# Patient Record
Sex: MALE | Race: WHITE | NOT HISPANIC OR LATINO | Employment: OTHER | URBAN - METROPOLITAN AREA
[De-identification: names, ages, dates, MRNs, and addresses within clinical notes are randomized per-mention and may not be internally consistent; named-entity substitution may affect disease eponyms.]

---

## 2023-09-17 PROBLEM — R13.12 DYSPHAGIA, OROPHARYNGEAL PHASE: Status: ACTIVE | Noted: 2023-09-17

## 2023-09-17 PROBLEM — J98.8 AIRWAY OBSTRUCTION: Status: ACTIVE | Noted: 2023-09-17

## 2023-09-17 PROBLEM — H65.20 CHRONIC SEROUS OTITIS MEDIA: Status: ACTIVE | Noted: 2023-09-17

## 2023-09-17 PROBLEM — L92.9: Status: ACTIVE | Noted: 2023-09-17

## 2023-09-17 PROBLEM — C32.9 LARYNGEAL CANCER (MULTI): Status: ACTIVE | Noted: 2023-09-17

## 2023-09-17 RX ORDER — SODIUM CHLORIDE 0.9 G/100ML
IRRIGANT IRRIGATION
COMMUNITY
Start: 2021-10-06

## 2023-10-17 NOTE — PROGRESS NOTES
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 This obviously will be a lifetime issue.     Status post myringotomy in the left ear.     I will see him in 6 months.      Chief Complaint     Follow-up status post tracheotomy and PEG tube      History of Present IllnessThis gentleman was seen in September 2021 at the request of his family physician. 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 For 4 years or so he has had some issues with episodes of pneumonia from aspiration. His swallowing has been more difficult and also the patient has noticed increased difficulty with breathing. 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 This was performed on September 30, 2021. The patient is here today for follow-up. He seems to be doing well. He was also complaining of some issues with hearing. 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  He also had a myringotomy tube placed on the left side.          Physical Exam  The examination of the neck shows the tracheotomy tube to be in proper position. The patient is breathing properly. His voice remains fairly weak.      The PEG tube site was also examined.  There is no significant granulation.  The tip was changed because it was broken.     A flexible laryngoscopy was carried out. Under topical Xylocaine and Micky-Synephrine the scope was introduced through the nostril. The nasopharynx, base of tongue, hypopharynx, and larynx are visualized. There is significant distortion of the larynx and the anatomy is difficult to delineate.  The larynx is completely  "closed.                        Additional Documentation    Vitals: Temp 36.4 °C (97.6 °F)     Ht 1.727 m (5' 8\")     Wt 61.3 kg (135 lb 2 oz)     BMI 20.55 kg/m²     BSA 1.71 m²   Flowsheets: Interfaced Flowsheet Data   Encounter Info: Billing Info,     History,     Allergies     Orders Placed    None  Medication Changes      None  Medication List  Visit Diagnoses      None  Problem List  "

## 2023-10-18 ENCOUNTER — OFFICE VISIT (OUTPATIENT)
Dept: OTOLARYNGOLOGY | Facility: CLINIC | Age: 88
End: 2023-10-18
Payer: MEDICARE

## 2023-10-18 VITALS — TEMPERATURE: 97.7 F | BODY MASS INDEX: 20.44 KG/M2 | WEIGHT: 134.4 LBS

## 2023-10-18 DIAGNOSIS — R13.12 DYSPHAGIA, OROPHARYNGEAL PHASE: ICD-10-CM

## 2023-10-18 DIAGNOSIS — C32.9 LARYNGEAL CANCER (MULTI): ICD-10-CM

## 2023-10-18 DIAGNOSIS — J98.8 AIRWAY OBSTRUCTION: Primary | ICD-10-CM

## 2023-10-18 PROCEDURE — 31575 DIAGNOSTIC LARYNGOSCOPY: CPT | Performed by: OTOLARYNGOLOGY

## 2023-10-18 PROCEDURE — 1036F TOBACCO NON-USER: CPT | Performed by: OTOLARYNGOLOGY

## 2023-10-18 PROCEDURE — 1160F RVW MEDS BY RX/DR IN RCRD: CPT | Performed by: OTOLARYNGOLOGY

## 2023-10-18 PROCEDURE — 99213 OFFICE O/P EST LOW 20 MIN: CPT | Performed by: OTOLARYNGOLOGY

## 2023-10-18 PROCEDURE — 1159F MED LIST DOCD IN RCRD: CPT | Performed by: OTOLARYNGOLOGY

## 2023-10-18 ASSESSMENT — PATIENT HEALTH QUESTIONNAIRE - PHQ9
SUM OF ALL RESPONSES TO PHQ9 QUESTIONS 1 AND 2: 0
2. FEELING DOWN, DEPRESSED OR HOPELESS: NOT AT ALL
1. LITTLE INTEREST OR PLEASURE IN DOING THINGS: NOT AT ALL

## 2024-04-23 NOTE — PROGRESS NOTES
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 This obviously will be a lifetime issue.  He needs to have his PEG tube changed and he will be set up for an appointment at the [facility].    Status post myringotomy in the left ear.  He had impacted cerumen and the tube was extruded and this was cleaned out with small instruments.     I will see him at the [facility] to change his PEG tube.     Chief Complaint     Follow-up status post tracheotomy and PEG tube placement.  Status posttreatment of laryngeal cancer     History of Present Illness    This gentleman was seen in September 2021 at the request of his family physician. 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 For 4 years or so he has had some issues with episodes of pneumonia from aspiration. His swallowing has been more difficult and also the patient has noticed increased difficulty with breathing. 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 This was performed on September 30, 2021. The patient is here today for follow-up.  He has had some issues with putting his hearing aids in his left ear.  This is the ear with a myringotomy tube.  He also has some issues with his PEG tube.     Physical Exam    The examination of the neck shows the tracheotomy tube to be in proper position.  I cannot appreciate any worrisome masses or adenopathies.  The patient is breathing properly. His voice remains fairly weak.      The PEG tube site was also examined.  He has some granulation tissue but he also has a tube that is really falling apart.  This will need to be  changed.    A flexible laryngoscopy was carried out. Under topical Xylocaine and Micky-Synephrine the scope was introduced through the nostril. The nasopharynx, base of tongue, hypopharynx, and larynx are visualized. There is significant distortion of the larynx and the anatomy is difficult to delineate.  The larynx is almost completely closed.    The ear examination shows impacted cerumen as well as an extruded myringotomy tube on the left side.  This was addressed with a small instrument.

## 2024-04-24 ENCOUNTER — OFFICE VISIT (OUTPATIENT)
Dept: OTOLARYNGOLOGY | Facility: CLINIC | Age: 89
End: 2024-04-24
Payer: MEDICARE

## 2024-04-24 VITALS — BODY MASS INDEX: 20.95 KG/M2 | TEMPERATURE: 97.6 F | HEIGHT: 67 IN | WEIGHT: 133.5 LBS

## 2024-04-24 DIAGNOSIS — H61.22 IMPACTED CERUMEN OF LEFT EAR: ICD-10-CM

## 2024-04-24 DIAGNOSIS — J98.8 AIRWAY OBSTRUCTION: Primary | ICD-10-CM

## 2024-04-24 DIAGNOSIS — R13.12 DYSPHAGIA, OROPHARYNGEAL PHASE: ICD-10-CM

## 2024-04-24 DIAGNOSIS — C32.9 LARYNGEAL CANCER (MULTI): ICD-10-CM

## 2024-04-24 PROCEDURE — 69210 REMOVE IMPACTED EAR WAX UNI: CPT | Performed by: OTOLARYNGOLOGY

## 2024-04-24 PROCEDURE — 31575 DIAGNOSTIC LARYNGOSCOPY: CPT | Performed by: OTOLARYNGOLOGY

## 2024-04-24 PROCEDURE — 1159F MED LIST DOCD IN RCRD: CPT | Performed by: OTOLARYNGOLOGY

## 2024-04-24 PROCEDURE — 99213 OFFICE O/P EST LOW 20 MIN: CPT | Performed by: OTOLARYNGOLOGY

## 2024-04-24 PROCEDURE — 1160F RVW MEDS BY RX/DR IN RCRD: CPT | Performed by: OTOLARYNGOLOGY

## 2024-04-24 PROCEDURE — 1036F TOBACCO NON-USER: CPT | Performed by: OTOLARYNGOLOGY

## 2024-04-24 ASSESSMENT — PATIENT HEALTH QUESTIONNAIRE - PHQ9
SUM OF ALL RESPONSES TO PHQ9 QUESTIONS 1 AND 2: 0
1. LITTLE INTEREST OR PLEASURE IN DOING THINGS: NOT AT ALL
2. FEELING DOWN, DEPRESSED OR HOPELESS: NOT AT ALL

## 2024-05-02 NOTE — PROGRESS NOTES
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 This obviously will be a lifetime issue.  His PEG tube was changed today.  He also had significant granulation around the tube which was removed and cauterized.    Status post myringotomy in the left ear.       I will see him in 6 months.     Chief Complaint     Follow-up status post tracheotomy and PEG tube placement.  Status posttreatment of laryngeal cancer     History of Present Illness    This gentleman was seen in September 2021 at the request of his family physician. 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 For 4 years or so he has had some issues with episodes of pneumonia from aspiration. His swallowing has been more difficult and also the patient has noticed increased difficulty with breathing. 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 This was performed on September 30, 2021. The patient is here today for follow-up.  He is here today to have his PEG tube changed.     Physical Exam    The PEG site was examined.  The tube was removed.  Using a capsule dome replacement the new tube was placed.  He also had a fair amount of granulation around the PEG site.  This was cauterized with silver nitrate and was then removed and recauterized.  A small dressing was applied over the area.  This was well-tolerated.

## 2024-05-03 ENCOUNTER — OFFICE VISIT (OUTPATIENT)
Dept: OTOLARYNGOLOGY | Facility: HOSPITAL | Age: 89
End: 2024-05-03
Payer: MEDICARE

## 2024-05-03 VITALS — BODY MASS INDEX: 21.11 KG/M2 | HEIGHT: 67 IN | TEMPERATURE: 97.3 F | WEIGHT: 134.5 LBS

## 2024-05-03 DIAGNOSIS — J98.8 AIRWAY OBSTRUCTION: Primary | ICD-10-CM

## 2024-05-03 DIAGNOSIS — L92.9 ABNORMAL GRANULATION TISSUE OF ABDOMEN: ICD-10-CM

## 2024-05-03 DIAGNOSIS — R13.12 DYSPHAGIA, OROPHARYNGEAL PHASE: ICD-10-CM

## 2024-05-03 DIAGNOSIS — C32.9 LARYNGEAL CANCER (MULTI): ICD-10-CM

## 2024-05-03 ASSESSMENT — PATIENT HEALTH QUESTIONNAIRE - PHQ9
2. FEELING DOWN, DEPRESSED OR HOPELESS: NOT AT ALL
SUM OF ALL RESPONSES TO PHQ9 QUESTIONS 1 & 2: 0
1. LITTLE INTEREST OR PLEASURE IN DOING THINGS: NOT AT ALL

## 2024-09-23 ENCOUNTER — HOSPITAL ENCOUNTER (INPATIENT)
Age: 89
LOS: 5 days | Discharge: HOME | DRG: 177 | End: 2024-09-28
Attending: STUDENT IN AN ORGANIZED HEALTH CARE EDUCATION/TRAINING PROGRAM | Admitting: INTERNAL MEDICINE
Payer: MEDICARE

## 2024-09-23 ENCOUNTER — APPOINTMENT (OUTPATIENT)
Dept: ULTRASOUND IMAGING | Age: 89
DRG: 177 | End: 2024-09-23
Payer: MEDICARE

## 2024-09-23 ENCOUNTER — APPOINTMENT (OUTPATIENT)
Dept: CT IMAGING | Age: 89
DRG: 177 | End: 2024-09-23
Payer: MEDICARE

## 2024-09-23 DIAGNOSIS — I26.99 PULMONARY EMBOLISM ON LEFT (HCC): Primary | ICD-10-CM

## 2024-09-23 DIAGNOSIS — U07.1 COVID-19: ICD-10-CM

## 2024-09-23 DIAGNOSIS — J18.9 PNEUMONIA DUE TO INFECTIOUS ORGANISM, UNSPECIFIED LATERALITY, UNSPECIFIED PART OF LUNG: ICD-10-CM

## 2024-09-23 LAB
ALBUMIN SERPL-MCNC: 3.4 G/DL (ref 3.5–5.2)
ALP SERPL-CCNC: 132 U/L (ref 40–129)
ALT SERPL-CCNC: 55 U/L (ref 0–40)
ANION GAP SERPL CALCULATED.3IONS-SCNC: 6 MMOL/L (ref 7–16)
AST SERPL-CCNC: 63 U/L (ref 0–39)
BASOPHILS # BLD: 0.01 K/UL (ref 0–0.2)
BASOPHILS NFR BLD: 0 % (ref 0–2)
BILIRUB SERPL-MCNC: 0.4 MG/DL (ref 0–1.2)
BUN SERPL-MCNC: 18 MG/DL (ref 6–23)
CALCIUM SERPL-MCNC: 9.1 MG/DL (ref 8.6–10.2)
CHLORIDE SERPL-SCNC: 99 MMOL/L (ref 98–107)
CO2 SERPL-SCNC: 30 MMOL/L (ref 22–29)
CREAT SERPL-MCNC: 0.7 MG/DL (ref 0.7–1.2)
EOSINOPHIL # BLD: 0 K/UL (ref 0.05–0.5)
EOSINOPHILS RELATIVE PERCENT: 0 % (ref 0–6)
ERYTHROCYTE [DISTWIDTH] IN BLOOD BY AUTOMATED COUNT: 13.3 % (ref 11.5–15)
ERYTHROCYTE [DISTWIDTH] IN BLOOD BY AUTOMATED COUNT: 13.4 % (ref 11.5–15)
FLUAV RNA RESP QL NAA+PROBE: NOT DETECTED
FLUBV RNA RESP QL NAA+PROBE: NOT DETECTED
GFR, ESTIMATED: >90 ML/MIN/1.73M2
GLUCOSE SERPL-MCNC: 104 MG/DL (ref 74–99)
HCT VFR BLD AUTO: 41.9 % (ref 37–54)
HCT VFR BLD AUTO: 46.6 % (ref 37–54)
HGB BLD-MCNC: 14.4 G/DL (ref 12.5–16.5)
HGB BLD-MCNC: 16.1 G/DL (ref 12.5–16.5)
IMM GRANULOCYTES # BLD AUTO: <0.03 K/UL (ref 0–0.58)
IMM GRANULOCYTES NFR BLD: 1 % (ref 0–5)
LACTATE BLDV-SCNC: 1.1 MMOL/L (ref 0.5–2.2)
LYMPHOCYTES NFR BLD: 1.08 K/UL (ref 1.5–4)
LYMPHOCYTES RELATIVE PERCENT: 28 % (ref 20–42)
MCH RBC QN AUTO: 33 PG (ref 26–35)
MCH RBC QN AUTO: 34 PG (ref 26–35)
MCHC RBC AUTO-ENTMCNC: 34.4 G/DL (ref 32–34.5)
MCHC RBC AUTO-ENTMCNC: 34.5 G/DL (ref 32–34.5)
MCV RBC AUTO: 95.9 FL (ref 80–99.9)
MCV RBC AUTO: 98.3 FL (ref 80–99.9)
MONOCYTES NFR BLD: 0.53 K/UL (ref 0.1–0.95)
MONOCYTES NFR BLD: 14 % (ref 2–12)
NEUTROPHILS NFR BLD: 57 % (ref 43–80)
NEUTS SEG NFR BLD: 2.19 K/UL (ref 1.8–7.3)
PARTIAL THROMBOPLASTIN TIME: 29 SEC (ref 24.5–35.1)
PLATELET # BLD AUTO: 102 K/UL (ref 130–450)
PLATELET # BLD AUTO: 91 K/UL (ref 130–450)
PMV BLD AUTO: 12.1 FL (ref 7–12)
PMV BLD AUTO: 12.3 FL (ref 7–12)
POTASSIUM SERPL-SCNC: 4.9 MMOL/L (ref 3.5–5)
PROT SERPL-MCNC: 7.4 G/DL (ref 6.4–8.3)
RBC # BLD AUTO: 4.37 M/UL (ref 3.8–5.8)
RBC # BLD AUTO: 4.74 M/UL (ref 3.8–5.8)
SARS-COV-2 RNA RESP QL NAA+PROBE: DETECTED
SODIUM SERPL-SCNC: 135 MMOL/L (ref 132–146)
SOURCE: ABNORMAL
SPECIMEN DESCRIPTION: ABNORMAL
TROPONIN I SERPL HS-MCNC: 12 NG/L (ref 0–11)
TROPONIN I SERPL HS-MCNC: 12 NG/L (ref 0–11)
WBC OTHER # BLD: 3.8 K/UL (ref 4.5–11.5)
WBC OTHER # BLD: 5.2 K/UL (ref 4.5–11.5)

## 2024-09-23 PROCEDURE — 83605 ASSAY OF LACTIC ACID: CPT

## 2024-09-23 PROCEDURE — 87077 CULTURE AEROBIC IDENTIFY: CPT

## 2024-09-23 PROCEDURE — 6360000004 HC RX CONTRAST MEDICATION: Performed by: RADIOLOGY

## 2024-09-23 PROCEDURE — 93970 EXTREMITY STUDY: CPT

## 2024-09-23 PROCEDURE — 84484 ASSAY OF TROPONIN QUANT: CPT

## 2024-09-23 PROCEDURE — 80053 COMPREHEN METABOLIC PANEL: CPT

## 2024-09-23 PROCEDURE — 85025 COMPLETE CBC W/AUTO DIFF WBC: CPT

## 2024-09-23 PROCEDURE — 85730 THROMBOPLASTIN TIME PARTIAL: CPT

## 2024-09-23 PROCEDURE — 87636 SARSCOV2 & INF A&B AMP PRB: CPT

## 2024-09-23 PROCEDURE — 71275 CT ANGIOGRAPHY CHEST: CPT

## 2024-09-23 PROCEDURE — 85027 COMPLETE CBC AUTOMATED: CPT

## 2024-09-23 PROCEDURE — 87070 CULTURE OTHR SPECIMN AEROBIC: CPT

## 2024-09-23 PROCEDURE — 93005 ELECTROCARDIOGRAM TRACING: CPT

## 2024-09-23 PROCEDURE — 6360000002 HC RX W HCPCS

## 2024-09-23 PROCEDURE — 2060000000 HC ICU INTERMEDIATE R&B

## 2024-09-23 PROCEDURE — 99285 EMERGENCY DEPT VISIT HI MDM: CPT

## 2024-09-23 PROCEDURE — 87205 SMEAR GRAM STAIN: CPT

## 2024-09-23 PROCEDURE — 2580000003 HC RX 258

## 2024-09-23 RX ORDER — HEPARIN SODIUM 1000 [USP'U]/ML
80 INJECTION, SOLUTION INTRAVENOUS; SUBCUTANEOUS PRN
Status: DISCONTINUED | OUTPATIENT
Start: 2024-09-23 | End: 2024-09-25

## 2024-09-23 RX ORDER — POLYETHYLENE GLYCOL 3350 17 G/17G
17 POWDER, FOR SOLUTION ORAL DAILY PRN
Status: DISCONTINUED | OUTPATIENT
Start: 2024-09-23 | End: 2024-09-28 | Stop reason: HOSPADM

## 2024-09-23 RX ORDER — SODIUM CHLORIDE 9 MG/ML
INJECTION, SOLUTION INTRAVENOUS PRN
Status: DISCONTINUED | OUTPATIENT
Start: 2024-09-23 | End: 2024-09-28 | Stop reason: HOSPADM

## 2024-09-23 RX ORDER — ENOXAPARIN SODIUM 100 MG/ML
40 INJECTION SUBCUTANEOUS DAILY
Status: DISCONTINUED | OUTPATIENT
Start: 2024-09-24 | End: 2024-09-23

## 2024-09-23 RX ORDER — HEPARIN SODIUM 1000 [USP'U]/ML
80 INJECTION, SOLUTION INTRAVENOUS; SUBCUTANEOUS ONCE
Status: COMPLETED | OUTPATIENT
Start: 2024-09-23 | End: 2024-09-23

## 2024-09-23 RX ORDER — DEXTROSE MONOHYDRATE 100 MG/ML
INJECTION, SOLUTION INTRAVENOUS CONTINUOUS PRN
Status: DISCONTINUED | OUTPATIENT
Start: 2024-09-23 | End: 2024-09-28 | Stop reason: HOSPADM

## 2024-09-23 RX ORDER — POTASSIUM CHLORIDE 7.45 MG/ML
10 INJECTION INTRAVENOUS PRN
Status: DISCONTINUED | OUTPATIENT
Start: 2024-09-23 | End: 2024-09-28 | Stop reason: HOSPADM

## 2024-09-23 RX ORDER — SODIUM CHLORIDE 0.9 % (FLUSH) 0.9 %
5-40 SYRINGE (ML) INJECTION PRN
Status: DISCONTINUED | OUTPATIENT
Start: 2024-09-23 | End: 2024-09-28 | Stop reason: HOSPADM

## 2024-09-23 RX ORDER — ACETAMINOPHEN 325 MG/1
650 TABLET ORAL EVERY 6 HOURS PRN
Status: DISCONTINUED | OUTPATIENT
Start: 2024-09-23 | End: 2024-09-28 | Stop reason: HOSPADM

## 2024-09-23 RX ORDER — GLUCAGON 1 MG/ML
1 KIT INJECTION PRN
Status: DISCONTINUED | OUTPATIENT
Start: 2024-09-23 | End: 2024-09-28 | Stop reason: HOSPADM

## 2024-09-23 RX ORDER — ACETAMINOPHEN 650 MG/1
650 SUPPOSITORY RECTAL EVERY 6 HOURS PRN
Status: DISCONTINUED | OUTPATIENT
Start: 2024-09-23 | End: 2024-09-28 | Stop reason: HOSPADM

## 2024-09-23 RX ORDER — MAGNESIUM SULFATE IN WATER 40 MG/ML
2000 INJECTION, SOLUTION INTRAVENOUS PRN
Status: DISCONTINUED | OUTPATIENT
Start: 2024-09-23 | End: 2024-09-28 | Stop reason: HOSPADM

## 2024-09-23 RX ORDER — IOPAMIDOL 755 MG/ML
75 INJECTION, SOLUTION INTRAVASCULAR
Status: COMPLETED | OUTPATIENT
Start: 2024-09-23 | End: 2024-09-23

## 2024-09-23 RX ORDER — SODIUM CHLORIDE 0.9 % (FLUSH) 0.9 %
5-40 SYRINGE (ML) INJECTION EVERY 12 HOURS SCHEDULED
Status: DISCONTINUED | OUTPATIENT
Start: 2024-09-23 | End: 2024-09-28 | Stop reason: HOSPADM

## 2024-09-23 RX ORDER — HEPARIN SODIUM 10000 [USP'U]/100ML
5-30 INJECTION, SOLUTION INTRAVENOUS CONTINUOUS
Status: DISCONTINUED | OUTPATIENT
Start: 2024-09-23 | End: 2024-09-25

## 2024-09-23 RX ORDER — POTASSIUM CHLORIDE 1500 MG/1
40 TABLET, EXTENDED RELEASE ORAL PRN
Status: DISCONTINUED | OUTPATIENT
Start: 2024-09-23 | End: 2024-09-28 | Stop reason: HOSPADM

## 2024-09-23 RX ORDER — HEPARIN SODIUM 1000 [USP'U]/ML
40 INJECTION, SOLUTION INTRAVENOUS; SUBCUTANEOUS PRN
Status: DISCONTINUED | OUTPATIENT
Start: 2024-09-23 | End: 2024-09-25

## 2024-09-23 RX ADMIN — IOPAMIDOL 75 ML: 755 INJECTION, SOLUTION INTRAVENOUS at 19:20

## 2024-09-23 RX ADMIN — WATER 1000 MG: 1 INJECTION INTRAMUSCULAR; INTRAVENOUS; SUBCUTANEOUS at 23:34

## 2024-09-23 RX ADMIN — HEPARIN SODIUM 4700 UNITS: 1000 INJECTION INTRAVENOUS; SUBCUTANEOUS at 23:32

## 2024-09-23 RX ADMIN — HEPARIN SODIUM AND DEXTROSE 18 UNITS/KG/HR: 10000; 5 INJECTION INTRAVENOUS at 23:34

## 2024-09-23 NOTE — ED NOTES
Department of Emergency Medicine  FIRST PROVIDER TRIAGE NOTE             Independent MLP           9/23/24  3:05 PM EDT    Date of Encounter: 9/23/24   MRN: [id number]      HPI: Juan De Leon is a 89 y.o. male who presents to the ED for Hemoptysis (Coughing up red blood for 2 days, pneumonia x3, no chest pain at rest only with coughing, short of breath with exertion )   Has tracheostomy    ROS: Negative for fever.    PE: Gen Appearance/Constitutional: alert  HEENT: NC/NT. PERRLA, tracheostomy patent  Neck: supple  CV: regular rate  Pulm: abnormal breath sounds auscultated  GI: soft and NT  Musculoskeletal: moves all extremities x 4  Lymphatics: no edema     Initial Plan of Care: All treatment areas with department are currently occupied. Proceed toTreatment Area When Bed Available for ED Attending/MLP to Continue Care    Electronically signed by TYSON Rios CNP   DD: 9/23/24

## 2024-09-23 NOTE — ED PROVIDER NOTES
[facility] EMERGENCY DEPARTMENT  EMERGENCY DEPARTMENT ENCOUNTER        Pt Name: Juan De Leon  MRN: [id number]  Birthdate 1935  Date of evaluation: 9/23/2024  Provider: Elsie Zamarripa DO  PCP: Joel Pagan MD  Note Started: 5:00 PM EDT 9/23/24    CHIEF COMPLAINT       Chief Complaint   Patient presents with    Hemoptysis     Coughing up red blood for 2 days, pneumonia x3, no chest pain at rest only with coughing, short of breath with exertion        HISTORY OF PRESENT ILLNESS: 1 or more Elements   History From: Patient    Limitations to history : None  Social Determinants : None    Juan De Leon is a 89 y.o. male who presents for hemoptysis.  Patient states that he has been coughing up sputum with a little bit of blood in it for the last 2 days.  Patient states this has happened 3 times in the past when he had pneumonia last year.  He has a tracheostomy.  This was inserted over a year ago.  He states he has had some shortness of breath.  He also has had increased cough and sputum production.  He states he has had some subjective fevers at home.  He denies any history of blood clots.  He is not on any blood thinners.  Denies any chills, n/v, headache, dizziness, vision changes, neck tenderness or stiffness, weakness, cp, palpitations, leg swelling/tenderness, abd pain, dysuria, hematuria, diarrhea, constipation, bloody stools.    Nursing Notes were all reviewed and agreed with or any disagreements were addressed in the HPI.    ROS:   Pertinent positives and negatives are stated within HPI, all other systems reviewed and are negative.      --------------------------------------------- PAST HISTORY ---------------------------------------------  Past Medical History:  has a past medical history of Cancer (HCC).    Past Surgical History:  has a past surgical history that includes tracheostomy and PEG w/tracheostomy placement.    Social History:      Family History: family history is not on    acetaminophen (TYLENOL) tablet 650 mg (has no administration in time range)     Or   acetaminophen (TYLENOL) suppository 650 mg (has no administration in time range)   iopamidol (ISOVUE-370) 76 % injection 75 mL (75 mLs IntraVENous Given 9/23/24 1920)       New Prescriptions    No medications on file         Counseling:   The emergency provider has spoken with the patient and discussed today’s results, in addition to providing specific details for the plan of care and counseling regarding the diagnosis and prognosis.  Questions are answered at this time and they are agreeable with the plan.       --------------------------------- IMPRESSION AND DISPOSITION ---------------------------------    IMPRESSION  1. Pulmonary embolism on left (HCC)    2. COVID-19    3. Pneumonia due to infectious organism, unspecified laterality, unspecified part of lung        DISPOSITION  Disposition: Admit to telemetry  Patient condition is stable        NOTE: This report was transcribed using voice recognition software. Every effort was made to ensure accuracy; however, inadvertent computerized transcription errors may be present

## 2024-09-24 ENCOUNTER — APPOINTMENT (OUTPATIENT)
Age: 89
DRG: 177 | End: 2024-09-24
Attending: INTERNAL MEDICINE
Payer: MEDICARE

## 2024-09-24 LAB
25(OH)D3 SERPL-MCNC: 44.7 NG/ML (ref 30–100)
ALBUMIN SERPL-MCNC: 3.3 G/DL (ref 3.5–5.2)
ALP SERPL-CCNC: 117 U/L (ref 40–129)
ALT SERPL-CCNC: 48 U/L (ref 0–40)
ANION GAP SERPL CALCULATED.3IONS-SCNC: 8 MMOL/L (ref 7–16)
AST SERPL-CCNC: 52 U/L (ref 0–39)
BASOPHILS # BLD: 0 K/UL (ref 0–0.2)
BASOPHILS NFR BLD: 0 % (ref 0–2)
BILIRUB SERPL-MCNC: 0.4 MG/DL (ref 0–1.2)
BNP SERPL-MCNC: 1594 PG/ML (ref 0–450)
BUN SERPL-MCNC: 13 MG/DL (ref 6–23)
CALCIUM SERPL-MCNC: 8.7 MG/DL (ref 8.6–10.2)
CHLORIDE SERPL-SCNC: 100 MMOL/L (ref 98–107)
CO2 SERPL-SCNC: 25 MMOL/L (ref 22–29)
CREAT SERPL-MCNC: 0.5 MG/DL (ref 0.7–1.2)
CRP SERPL HS-MCNC: 37 MG/L (ref 0–5)
EOSINOPHIL # BLD: 0 K/UL (ref 0.05–0.5)
EOSINOPHILS RELATIVE PERCENT: 0 % (ref 0–6)
ERYTHROCYTE [DISTWIDTH] IN BLOOD BY AUTOMATED COUNT: 13.2 % (ref 11.5–15)
FERRITIN SERPL-MCNC: 432 NG/ML
GFR, ESTIMATED: >90 ML/MIN/1.73M2
GLUCOSE SERPL-MCNC: 117 MG/DL (ref 74–99)
HCT VFR BLD AUTO: 41.8 % (ref 37–54)
HGB BLD-MCNC: 14.3 G/DL (ref 12.5–16.5)
INR PPP: 1.1
L PNEUMO1 AG UR QL IA.RAPID: NEGATIVE
LACTATE BLDV-SCNC: 0.9 MMOL/L (ref 0.5–2.2)
LYMPHOCYTES NFR BLD: 0.46 K/UL (ref 1.5–4)
LYMPHOCYTES RELATIVE PERCENT: 8 % (ref 20–42)
MAGNESIUM SERPL-MCNC: 1.9 MG/DL (ref 1.6–2.6)
MCH RBC QN AUTO: 32.7 PG (ref 26–35)
MCHC RBC AUTO-ENTMCNC: 34.2 G/DL (ref 32–34.5)
MCV RBC AUTO: 95.7 FL (ref 80–99.9)
MONOCYTES NFR BLD: 0.61 K/UL (ref 0.1–0.95)
MONOCYTES NFR BLD: 11 % (ref 2–12)
NEUTROPHILS NFR BLD: 82 % (ref 43–80)
NEUTS SEG NFR BLD: 4.73 K/UL (ref 1.8–7.3)
PARTIAL THROMBOPLASTIN TIME: 135.9 SEC (ref 24.5–35.1)
PARTIAL THROMBOPLASTIN TIME: 157.7 SEC (ref 24.5–35.1)
PARTIAL THROMBOPLASTIN TIME: 34.1 SEC (ref 24.5–35.1)
PHOSPHATE SERPL-MCNC: 2.7 MG/DL (ref 2.5–4.5)
PLATELET # BLD AUTO: 92 K/UL (ref 130–450)
PLATELET CONFIRMATION: NORMAL
PLATELET CONFIRMATION: NORMAL
PMV BLD AUTO: 12.7 FL (ref 7–12)
POTASSIUM SERPL-SCNC: 4.1 MMOL/L (ref 3.5–5)
PROCALCITONIN SERPL-MCNC: 0.24 NG/ML (ref 0–0.08)
PROT SERPL-MCNC: 6.9 G/DL (ref 6.4–8.3)
PROTHROMBIN TIME: 11.6 SEC (ref 9.3–12.4)
RBC # BLD AUTO: 4.37 M/UL (ref 3.8–5.8)
RBC # BLD: NORMAL 10*6/UL
S PNEUM AG SPEC QL: NEGATIVE
SODIUM SERPL-SCNC: 133 MMOL/L (ref 132–146)
SPECIMEN SOURCE: NORMAL
T4 FREE SERPL-MCNC: 1.3 NG/DL (ref 0.9–1.7)
TROPONIN I SERPL HS-MCNC: 13 NG/L (ref 0–11)
TSH SERPL DL<=0.05 MIU/L-ACNC: 2.3 UIU/ML (ref 0.27–4.2)
WBC OTHER # BLD: 5.8 K/UL (ref 4.5–11.5)

## 2024-09-24 PROCEDURE — 86481 TB AG RESPONSE T-CELL SUSP: CPT

## 2024-09-24 PROCEDURE — 99222 1ST HOSP IP/OBS MODERATE 55: CPT | Performed by: INTERNAL MEDICINE

## 2024-09-24 PROCEDURE — 85025 COMPLETE CBC W/AUTO DIFF WBC: CPT

## 2024-09-24 PROCEDURE — 6370000000 HC RX 637 (ALT 250 FOR IP): Performed by: INTERNAL MEDICINE

## 2024-09-24 PROCEDURE — 82306 VITAMIN D 25 HYDROXY: CPT

## 2024-09-24 PROCEDURE — 84100 ASSAY OF PHOSPHORUS: CPT

## 2024-09-24 PROCEDURE — 2580000003 HC RX 258

## 2024-09-24 PROCEDURE — 85730 THROMBOPLASTIN TIME PARTIAL: CPT

## 2024-09-24 PROCEDURE — 84443 ASSAY THYROID STIM HORMONE: CPT

## 2024-09-24 PROCEDURE — 86140 C-REACTIVE PROTEIN: CPT

## 2024-09-24 PROCEDURE — 94664 DEMO&/EVAL PT USE INHALER: CPT

## 2024-09-24 PROCEDURE — 87449 NOS EACH ORGANISM AG IA: CPT

## 2024-09-24 PROCEDURE — 83605 ASSAY OF LACTIC ACID: CPT

## 2024-09-24 PROCEDURE — 94640 AIRWAY INHALATION TREATMENT: CPT

## 2024-09-24 PROCEDURE — 2580000003 HC RX 258: Performed by: INTERNAL MEDICINE

## 2024-09-24 PROCEDURE — 85610 PROTHROMBIN TIME: CPT

## 2024-09-24 PROCEDURE — 36415 COLL VENOUS BLD VENIPUNCTURE: CPT

## 2024-09-24 PROCEDURE — 82728 ASSAY OF FERRITIN: CPT

## 2024-09-24 PROCEDURE — 84484 ASSAY OF TROPONIN QUANT: CPT

## 2024-09-24 PROCEDURE — 80053 COMPREHEN METABOLIC PANEL: CPT

## 2024-09-24 PROCEDURE — 2700000000 HC OXYGEN THERAPY PER DAY

## 2024-09-24 PROCEDURE — 6360000002 HC RX W HCPCS: Performed by: INTERNAL MEDICINE

## 2024-09-24 PROCEDURE — 87899 AGENT NOS ASSAY W/OPTIC: CPT

## 2024-09-24 PROCEDURE — 84439 ASSAY OF FREE THYROXINE: CPT

## 2024-09-24 PROCEDURE — 6360000002 HC RX W HCPCS

## 2024-09-24 PROCEDURE — 93306 TTE W/DOPPLER COMPLETE: CPT

## 2024-09-24 PROCEDURE — 83735 ASSAY OF MAGNESIUM: CPT

## 2024-09-24 PROCEDURE — 2060000000 HC ICU INTERMEDIATE R&B

## 2024-09-24 PROCEDURE — 2500000003 HC RX 250 WO HCPCS: Performed by: INTERNAL MEDICINE

## 2024-09-24 PROCEDURE — 2500000003 HC RX 250 WO HCPCS

## 2024-09-24 PROCEDURE — 83880 ASSAY OF NATRIURETIC PEPTIDE: CPT

## 2024-09-24 PROCEDURE — 84145 PROCALCITONIN (PCT): CPT

## 2024-09-24 RX ORDER — IPRATROPIUM BROMIDE AND ALBUTEROL SULFATE 2.5; .5 MG/3ML; MG/3ML
1 SOLUTION RESPIRATORY (INHALATION)
Status: DISCONTINUED | OUTPATIENT
Start: 2024-09-24 | End: 2024-09-28 | Stop reason: HOSPADM

## 2024-09-24 RX ORDER — 0.9 % SODIUM CHLORIDE 0.9 %
1000 INTRAVENOUS SOLUTION INTRAVENOUS ONCE
Status: COMPLETED | OUTPATIENT
Start: 2024-09-24 | End: 2024-09-24

## 2024-09-24 RX ORDER — DEXAMETHASONE SODIUM PHOSPHATE 10 MG/ML
6 INJECTION INTRAMUSCULAR; INTRAVENOUS EVERY 24 HOURS
Status: DISCONTINUED | OUTPATIENT
Start: 2024-09-24 | End: 2024-09-26

## 2024-09-24 RX ORDER — IPRATROPIUM BROMIDE AND ALBUTEROL SULFATE 2.5; .5 MG/3ML; MG/3ML
1 SOLUTION RESPIRATORY (INHALATION)
Status: DISCONTINUED | OUTPATIENT
Start: 2024-09-24 | End: 2024-09-24

## 2024-09-24 RX ADMIN — Medication 10 ML: at 11:00

## 2024-09-24 RX ADMIN — DOXYCYCLINE 100 MG: 100 INJECTION, POWDER, LYOPHILIZED, FOR SOLUTION INTRAVENOUS at 00:00

## 2024-09-24 RX ADMIN — IPRATROPIUM BROMIDE AND ALBUTEROL SULFATE 1 DOSE: .5; 2.5 SOLUTION RESPIRATORY (INHALATION) at 08:00

## 2024-09-24 RX ADMIN — IPRATROPIUM BROMIDE AND ALBUTEROL SULFATE 1 DOSE: 2.5; .5 SOLUTION RESPIRATORY (INHALATION) at 22:15

## 2024-09-24 RX ADMIN — PIPERACILLIN AND TAZOBACTAM 4500 MG: 4; .5 INJECTION, POWDER, LYOPHILIZED, FOR SOLUTION INTRAVENOUS at 18:14

## 2024-09-24 RX ADMIN — Medication 10 ML: at 06:08

## 2024-09-24 RX ADMIN — IPRATROPIUM BROMIDE AND ALBUTEROL SULFATE 1 DOSE: .5; 2.5 SOLUTION RESPIRATORY (INHALATION) at 04:21

## 2024-09-24 RX ADMIN — DEXAMETHASONE SODIUM PHOSPHATE 6 MG: 10 INJECTION INTRAMUSCULAR; INTRAVENOUS at 06:07

## 2024-09-24 RX ADMIN — IPRATROPIUM BROMIDE AND ALBUTEROL SULFATE 1 DOSE: 2.5; .5 SOLUTION RESPIRATORY (INHALATION) at 18:23

## 2024-09-24 RX ADMIN — DOXYCYCLINE 100 MG: 100 INJECTION, POWDER, LYOPHILIZED, FOR SOLUTION INTRAVENOUS at 12:23

## 2024-09-24 RX ADMIN — HEPARIN SODIUM 4700 UNITS: 1000 INJECTION INTRAVENOUS; SUBCUTANEOUS at 13:35

## 2024-09-24 RX ADMIN — SODIUM CHLORIDE 1000 ML: 9 INJECTION, SOLUTION INTRAVENOUS at 06:06

## 2024-09-24 RX ADMIN — IPRATROPIUM BROMIDE AND ALBUTEROL SULFATE 1 DOSE: 2.5; .5 SOLUTION RESPIRATORY (INHALATION) at 13:11

## 2024-09-24 ASSESSMENT — PAIN SCALES - GENERAL: PAINLEVEL_OUTOF10: 0

## 2024-09-24 ASSESSMENT — LIFESTYLE VARIABLES: HOW OFTEN DO YOU HAVE A DRINK CONTAINING ALCOHOL: NEVER

## 2024-09-24 NOTE — PROGRESS NOTES
4 Eyes Skin Assessment     NAME:  Juan De Leon  YOB: 1935  MEDICAL RECORD NUMBER:  [id number]    The patient is being assessed for  Admission    I agree that at least one RN has performed a thorough Head to Toe Skin Assessment on the patient. ALL assessment sites listed below have been assessed.      Areas assessed by both nurses:    Head, Face, Ears, Shoulders, Back, Chest, Arms, Elbows, Hands, Sacrum. Buttock, Coccyx, Ischium, Legs. Feet and Heels, and Under Medical Devices         Does the Patient have a Wound? Yes wound(s) were present on assessment. LDA wound assessment was Initiated and completed by RN       John Prevention initiated by RN: Yes  Wound Care Orders initiated by RN: Yes    Pressure Injury (Stage 3,4, Unstageable, DTI, NWPT, and Complex wounds) if present, place Wound referral order by RN under : Yes    New Ostomies, if present place, Ostomy referral order under : No     Nurse 1 eSignature: Electronically signed by Petra Rutherford RN on 9/24/24 at 3:50 PM EDT    **SHARE this note so that the co-signing nurse can place an eSignature**    Nurse 2 eSignature: {Esignature:[id number]}

## 2024-09-24 NOTE — CONSULTS
[facility]  Department of Internal Medicine  Division of Pulmonary, Critical Care and Sleep Medicine  Consult Note      Eugene Alexander, APRN-CNP  Petra Hutchins, APRN-CNP    ICU Intensivist Attestation:    I saw, examined, and discussed the patient with Petra Hutchins APRN-ACNP and agree with her assessment and plan.    The patient is doing fairly well considering his tracheostomy and PEG tube requirements as well as his history of laryngeal cancer with neck metastases in combination with his history of prostate cancer.    He is therefore likely hypercoagulable for both of these reasons leading to his left lower lobe pulmonary embolism.    There is evidence of pneumonia in the basilar segments of both lower lobes which could very well be secondary to aspiration.  Therefore, we will change the ceftriaxone to Zosyn which will cover anaerobes and Pseudomonas better than the ceftriaxone.  We will continue his steroids and make sure his aerosolized bronchodilators are continued.  Sputum for Gram stain and C&S, if not already done, will be ordered.    Electronically signed by Jett Mccollum MD on 2024 at 5:09 PM      Patient: Juan De Leon  MRN: [id number]  : 1935    Encounter Time: 1:44 PM     Date of Admission: 2024  4:32 PM    Primary Care Physician: Joel Pagan MD    Reason for Consultation: Pulmonary embolism, COVID-19, acute hypoxic respiratory failure     HISTORY OF PRESENT ILLNESS : Juan De Leon 89 y.o. male was seen in consultation regarding the above chief complaint with past medical history noted for laryngeal cancer with neck metastasis status post laryngectomy with tracheostomy and PEG tube 2021, left exudative pleural effusion 2023 cytology and culture negative, dysphagia with recurrent aspiration pneumonia presents to  inflammatory markers  Trach care and suction as needed  Speech therapy evaluation  Suspect patient is hypercoagulable because of his history of prostate cancer and laryngeal cancer as well as prolonged bedrest because of his impaired mobility based on tracheostomy and tube feeding needs  Change ceftriaxone to Zosyn for better anaerobic and Pseudomonas coverage        TYSON [place] - SUDARSHAN       ICU/PULMONARY Staff Physician note of personal involvement in Care  As the attending physician, I certify that I personally reviewed the patient’s history and personnally examined the patient to confirm the physical findings described above,  And that I reviewed the relevant imaging studies and available reports.  I also discussed the differential diagnosis and all of the proposed management plans with the patient and individuals accompanying the patient to this visit.  They had the opportunity to ask questions about the proposed management plans and to have those questions answered.     This patient has a high probability of sudden, clinically significant deterioration, which requires the highest level of physician preparedness to intervene urgently.  I managed/supervised life or organ supporting interventions that required frequent physician assessment.   I devoted my full attention to the direct care of this patient for the amount of time indicated below.  Time I spent with the family or surrogate(s) is included only if the patient was incapable of providing the necessary information or participating in medical decisions - Time devoted to teaching and to any procedures I billed separately is not included.    My time caring for this patient including history, physical examination, and medical management and evaluation added up to greater than 50% of the total time spent with this patient, including shared/split visits, should this note  reflect this event.     Critical Care Time: 31 minutes

## 2024-09-24 NOTE — H&P
Department of Internal Medicine  History and Physical    PCP: Joel Pagan MD  Admitting Physician: Dr. Galindo  Consultants:   Date of Service: 9/23/2024    CHIEF COMPLAINT:  sob/hemoptysis    HISTORY OF PRESENT ILLNESS:    Patient is 89-year-old male who presented to the ED with hemoptysis, shortness of breath and fever.  Patient has a history of throat cancer has completed treatment for this.  He does have tracheostomy in place.  He does not take anything by mouth and has PEG tube in place for tube feeds ,.  He admits to feeling more short of breath recently.  He admits to bouts of hemoptysis which she has had in the past when diagnosed with pneumonia.  He admits to fever.  He denies any history of DVT or PE.     PAST MEDICAL Hx:  Past Medical History:   Diagnosis Date    Cancer (HCC)        PAST SURGICAL Hx:   Past Surgical History:   Procedure Laterality Date    PEG W/TRACHEOSTOMY PLACEMENT      TRACHEOSTOMY         FAMILY Hx:  No family history on file.    HOME MEDICATIONS:  Prior to Admission medications    Not on File       ALLERGIES:  Patient has no known allergies.    SOCIAL Hx:  Social History     Socioeconomic History    Marital status:      Spouse name: Not on file    Number of children: Not on file    Years of education: Not on file    Highest education level: Not on file   Occupational History    Not on file   Tobacco Use    Smoking status: Not on file    Smokeless tobacco: Not on file   Substance and Sexual Activity    Alcohol use: Not on file    Drug use: Not on file    Sexual activity: Not on file   Other Topics Concern    Not on file   Social History Narrative    Not on file     Social Determinants of Health     Financial Resource Strain: Not on file   Food Insecurity: Not on file   Transportation Needs: Not on file   Physical Activity: Not on file   Stress: Not on file   Social Connections: Not on file   Intimate Partner Violence: Not on file   Housing Stability: Not on file       ROS:

## 2024-09-24 NOTE — PROGRESS NOTES
This patient is on medication that requires renal, weight, and/or indication dose adjustment.      Date Body Weight IBW  Adjusted BW SCr  CrCl Dialysis status BMI   9/24/2024 59 kg (130 lb) Ideal body weight: 70.7 kg (155 lb 13.8 oz) Serum creatinine: 0.5 mg/dL (L) 09/24/24 0556  Estimated creatinine clearance: 87 mL/min (A) N/a Body mass index is 19.2 kg/m².       Pharmacy has dose-adjusted the following medication(s):    Ordered Medication: Zosyn 3375mg q8H     Order Changed/converted to: Zosyn 4500mg q8h    These changes were made per protocol according to the [facility]   [facility] Dose Adjustment Policy.     *Please note this dose may need readjusted if patient's condition changes.    Please contact pharmacy with any questions regarding these changes.    Kezia Ramos, PharmD.  9/24/2024 5:20 PM    SJW: [phone]

## 2024-09-24 NOTE — PROGRESS NOTES
Department of Internal Medicine  PN    PCP: Joel Pagan MD  Admitting Physician: Dr. Galindo  Consultants:   Date of Service: 9/23/2024    CHIEF COMPLAINT:  sob/hemoptysis    HISTORY OF PRESENT ILLNESS:    Patient is 89-year-old male who presented to the ED with hemoptysis, shortness of breath and fever.  Patient has a history of throat cancer has completed treatment for this.  He does have tracheostomy in place.  He does not take anything by mouth and has PEG tube in place for tube feeds ,.  He admits to feeling more short of breath recently.  He admits to bouts of hemoptysis which she has had in the past when diagnosed with pneumonia.  He admits to fever.  He denies any history of DVT or PE.     9/24/2024  Patient seen and examined on monitored bed.  Patient's wife is at the bedside and case discussed.  Patient denies any current chest pain, abdominal pain.  Patient was aerosol trach mask in place on 7 L.  BUN/creatinine 13/0.5 with normal electrolytes.  Mild elevation of transaminase with a WBC 5.8 and hemoglobin 14.3.  Platelet count is 92.  Patient is positive for COVID-19.  Temperature is 99.6 with a heart of 64 blood pressure 110/51 with an O2 sat 94% on 8 L nasal cannula..  CTA of the lungs showed pulmonary embolism in the medial basilar segment artery of the left lower lobe.  Mild elevation of the RV/LV ratio but not suggestive of right ventricular strain.  Moderate bronchial thickening in lower lobes from bronchitis with a left greater than right.  Mild patchy interstitial infiltrate both lower lobes.  Patient wife states that the patient has been going to different hospitals including [facility], [facility], [facility].  This is the first admission in the [facility].    PAST MEDICAL Hx:  Past Medical History:   Diagnosis Date    Cancer (HCC)        PAST SURGICAL Hx:   Past Surgical History:   Procedure Laterality Date    PEG W/TRACHEOSTOMY PLACEMENT      TRACHEOSTOMY

## 2024-09-25 ENCOUNTER — APPOINTMENT (OUTPATIENT)
Dept: GENERAL RADIOLOGY | Age: 89
DRG: 177 | End: 2024-09-25
Payer: MEDICARE

## 2024-09-25 LAB
AADO2: 118.2 MMHG
ALBUMIN SERPL-MCNC: 3 G/DL (ref 3.5–5.2)
ALP SERPL-CCNC: 114 U/L (ref 40–129)
ALT SERPL-CCNC: 47 U/L (ref 0–40)
ANION GAP SERPL CALCULATED.3IONS-SCNC: 11 MMOL/L (ref 7–16)
AST SERPL-CCNC: 44 U/L (ref 0–39)
B.E.: -0.7 MMOL/L (ref -3–3)
BASOPHILS # BLD: 0 K/UL (ref 0–0.2)
BASOPHILS NFR BLD: 0 % (ref 0–2)
BILIRUB SERPL-MCNC: 0.4 MG/DL (ref 0–1.2)
BUN SERPL-MCNC: 17 MG/DL (ref 6–23)
CALCIUM SERPL-MCNC: 8.5 MG/DL (ref 8.6–10.2)
CHLORIDE SERPL-SCNC: 103 MMOL/L (ref 98–107)
CO2 SERPL-SCNC: 22 MMOL/L (ref 22–29)
COHB: 0.3 % (ref 0–1.5)
CREAT SERPL-MCNC: 0.6 MG/DL (ref 0.7–1.2)
CRITICAL: ABNORMAL
DATE ANALYZED: ABNORMAL
DATE OF COLLECTION: ABNORMAL
ECHO AO ASC DIAM: 3.1 CM
ECHO AO ASCENDING AORTA INDEX: 1.8 CM/M2
ECHO AV AREA PEAK VELOCITY: 2.9 CM2
ECHO AV AREA VTI: 2.9 CM2
ECHO AV AREA/BSA PEAK VELOCITY: 1.7 CM2/M2
ECHO AV AREA/BSA VTI: 1.7 CM2/M2
ECHO AV CUSP MM: 1.6 CM
ECHO AV MEAN GRADIENT: 3 MMHG
ECHO AV MEAN VELOCITY: 0.9 M/S
ECHO AV PEAK GRADIENT: 6 MMHG
ECHO AV PEAK VELOCITY: 1.2 M/S
ECHO AV VELOCITY RATIO: 0.75
ECHO AV VTI: 22.7 CM
ECHO BSA: 1.69 M2
ECHO EST RA PRESSURE: 3 MMHG
ECHO LA DIAMETER INDEX: 1.74 CM/M2
ECHO LA DIAMETER: 3 CM
ECHO LA VOL A-L A2C: 17 ML (ref 18–58)
ECHO LA VOL A-L A4C: 47 ML (ref 18–58)
ECHO LA VOL BP: 31 ML (ref 18–58)
ECHO LA VOL MOD A2C: 14 ML (ref 18–58)
ECHO LA VOL MOD A4C: 44 ML (ref 18–58)
ECHO LA VOL/BSA BIPLANE: 18 ML/M2 (ref 16–34)
ECHO LA VOLUME AREA LENGTH: 35 ML
ECHO LA VOLUME INDEX A-L A2C: 10 ML/M2 (ref 16–34)
ECHO LA VOLUME INDEX A-L A4C: 27 ML/M2 (ref 16–34)
ECHO LA VOLUME INDEX AREA LENGTH: 20 ML/M2 (ref 16–34)
ECHO LA VOLUME INDEX MOD A2C: 8 ML/M2 (ref 16–34)
ECHO LA VOLUME INDEX MOD A4C: 26 ML/M2 (ref 16–34)
ECHO LV EDV A2C: 100 ML
ECHO LV EDV A4C: 104 ML
ECHO LV EDV BP: 110 ML (ref 67–155)
ECHO LV EDV INDEX A4C: 60 ML/M2
ECHO LV EDV INDEX BP: 64 ML/M2
ECHO LV EDV NDEX A2C: 58 ML/M2
ECHO LV EF PHYSICIAN: 60 %
ECHO LV EJECTION FRACTION A2C: 58 %
ECHO LV EJECTION FRACTION A4C: 63 %
ECHO LV EJECTION FRACTION BIPLANE: 61 % (ref 55–100)
ECHO LV ESV A2C: 42 ML
ECHO LV ESV A4C: 38 ML
ECHO LV ESV BP: 43 ML (ref 22–58)
ECHO LV ESV INDEX A2C: 24 ML/M2
ECHO LV ESV INDEX A4C: 22 ML/M2
ECHO LV ESV INDEX BP: 25 ML/M2
ECHO LV FRACTIONAL SHORTENING: 29 % (ref 28–44)
ECHO LV INTERNAL DIMENSION DIASTOLE INDEX: 2.03 CM/M2
ECHO LV INTERNAL DIMENSION DIASTOLIC: 3.5 CM (ref 4.2–5.9)
ECHO LV INTERNAL DIMENSION SYSTOLIC INDEX: 1.45 CM/M2
ECHO LV INTERNAL DIMENSION SYSTOLIC: 2.5 CM
ECHO LV IVSD: 1 CM (ref 0.6–1)
ECHO LV MASS 2D: 111 G (ref 88–224)
ECHO LV MASS INDEX 2D: 64.6 G/M2 (ref 49–115)
ECHO LV POSTERIOR WALL DIASTOLIC: 1.1 CM (ref 0.6–1)
ECHO LV RELATIVE WALL THICKNESS RATIO: 0.63
ECHO LVOT AREA: 3.8 CM2
ECHO LVOT AV VTI INDEX: 0.76
ECHO LVOT DIAM: 2.2 CM
ECHO LVOT MEAN GRADIENT: 1 MMHG
ECHO LVOT PEAK GRADIENT: 4 MMHG
ECHO LVOT PEAK VELOCITY: 0.9 M/S
ECHO LVOT STROKE VOLUME INDEX: 38 ML/M2
ECHO LVOT SV: 65.3 ML
ECHO LVOT VTI: 17.2 CM
ECHO MV "A" WAVE DURATION: 148.4 MSEC
ECHO MV A VELOCITY: 0.95 M/S
ECHO MV AREA PHT: 3.5 CM2
ECHO MV AREA VTI: 3.1 CM2
ECHO MV E DECELERATION TIME (DT): 149.2 MS
ECHO MV E VELOCITY: 0.61 M/S
ECHO MV E/A RATIO: 0.64
ECHO MV LVOT VTI INDEX: 1.23
ECHO MV MAX VELOCITY: 1 M/S
ECHO MV MEAN GRADIENT: 2 MMHG
ECHO MV MEAN VELOCITY: 0.6 M/S
ECHO MV PEAK GRADIENT: 4 MMHG
ECHO MV PRESSURE HALF TIME (PHT): 62 MS
ECHO MV VTI: 21.1 CM
ECHO PV MAX VELOCITY: 0.8 M/S
ECHO PV MEAN GRADIENT: 1 MMHG
ECHO PV MEAN VELOCITY: 0.5 M/S
ECHO PV PEAK GRADIENT: 2 MMHG
ECHO PV VTI: 14.5 CM
ECHO PVEIN A DURATION: 140.8 MS
ECHO PVEIN A VELOCITY: 0.3 M/S
ECHO PVEIN PEAK D VELOCITY: 0.3 M/S
ECHO PVEIN PEAK S VELOCITY: 0.6 M/S
ECHO PVEIN S/D RATIO: 2
ECHO RIGHT VENTRICULAR SYSTOLIC PRESSURE (RVSP): 17 MMHG
ECHO RV INTERNAL DIMENSION: 3.2 CM
ECHO RV MID DIMENSION: 2.4 CM
ECHO TV REGURGITANT MAX VELOCITY: 1.87 M/S
ECHO TV REGURGITANT PEAK GRADIENT: 14 MMHG
EOSINOPHIL # BLD: 0 K/UL (ref 0.05–0.5)
EOSINOPHILS RELATIVE PERCENT: 0 % (ref 0–6)
ERYTHROCYTE [DISTWIDTH] IN BLOOD BY AUTOMATED COUNT: 13.4 % (ref 11.5–15)
FIO2: 35 %
GFR, ESTIMATED: >90 ML/MIN/1.73M2
GLUCOSE SERPL-MCNC: 164 MG/DL (ref 74–99)
HCO3: 22.4 MMOL/L (ref 22–26)
HCT VFR BLD AUTO: 42.7 % (ref 37–54)
HGB BLD-MCNC: 14 G/DL (ref 12.5–16.5)
HHB: 3.1 % (ref 0–5)
LAB: ABNORMAL
LYMPHOCYTES NFR BLD: 0.23 K/UL (ref 1.5–4)
LYMPHOCYTES RELATIVE PERCENT: 4 % (ref 20–42)
Lab: 700
MCH RBC QN AUTO: 32.6 PG (ref 26–35)
MCHC RBC AUTO-ENTMCNC: 32.8 G/DL (ref 32–34.5)
MCV RBC AUTO: 99.3 FL (ref 80–99.9)
METHB: 0.1 % (ref 0–1.5)
MODE: ABNORMAL
MONOCYTES NFR BLD: 0.06 K/UL (ref 0.1–0.95)
MONOCYTES NFR BLD: 1 % (ref 2–12)
NEUTROPHILS NFR BLD: 96 % (ref 43–80)
NEUTS SEG NFR BLD: 6.21 K/UL (ref 1.8–7.3)
O2 CONTENT: 19.6 ML/DL
O2 SATURATION: 96.9 % (ref 92–98.5)
O2HB: 96.5 % (ref 94–97)
OPERATOR ID: 1119
PARTIAL THROMBOPLASTIN TIME: 88.5 SEC (ref 24.5–35.1)
PARTIAL THROMBOPLASTIN TIME: 92.5 SEC (ref 24.5–35.1)
PATIENT TEMP: 37 C
PCO2: 32.5 MMHG (ref 35–45)
PFO2: 2.42 MMHG/%
PH BLOOD GAS: 7.46 (ref 7.35–7.45)
PLATELET CONFIRMATION: NORMAL
PLATELET, FLUORESCENCE: 103 K/UL (ref 130–450)
PMV BLD AUTO: 13 FL (ref 7–12)
PO2: 84.8 MMHG (ref 75–100)
POTASSIUM SERPL-SCNC: 4.2 MMOL/L (ref 3.5–5)
PROT SERPL-MCNC: 6.8 G/DL (ref 6.4–8.3)
RBC # BLD AUTO: 4.3 M/UL (ref 3.8–5.8)
RBC # BLD: NORMAL 10*6/UL
RI(T): 1.39
SODIUM SERPL-SCNC: 136 MMOL/L (ref 132–146)
SOURCE, BLOOD GAS: ABNORMAL
THB: 14.4 G/DL (ref 11.5–16.5)
TIME ANALYZED: 706
WBC OTHER # BLD: 6.5 K/UL (ref 4.5–11.5)

## 2024-09-25 PROCEDURE — 6360000002 HC RX W HCPCS: Performed by: INTERNAL MEDICINE

## 2024-09-25 PROCEDURE — 6370000000 HC RX 637 (ALT 250 FOR IP): Performed by: NURSE PRACTITIONER

## 2024-09-25 PROCEDURE — 6370000000 HC RX 637 (ALT 250 FOR IP): Performed by: INTERNAL MEDICINE

## 2024-09-25 PROCEDURE — 85730 THROMBOPLASTIN TIME PARTIAL: CPT

## 2024-09-25 PROCEDURE — 85025 COMPLETE CBC W/AUTO DIFF WBC: CPT

## 2024-09-25 PROCEDURE — 82805 BLOOD GASES W/O2 SATURATION: CPT

## 2024-09-25 PROCEDURE — 2580000003 HC RX 258: Performed by: INTERNAL MEDICINE

## 2024-09-25 PROCEDURE — 2060000000 HC ICU INTERMEDIATE R&B

## 2024-09-25 PROCEDURE — 2500000003 HC RX 250 WO HCPCS: Performed by: INTERNAL MEDICINE

## 2024-09-25 PROCEDURE — 71045 X-RAY EXAM CHEST 1 VIEW: CPT

## 2024-09-25 PROCEDURE — 36415 COLL VENOUS BLD VENIPUNCTURE: CPT

## 2024-09-25 PROCEDURE — 80053 COMPREHEN METABOLIC PANEL: CPT

## 2024-09-25 PROCEDURE — 36600 WITHDRAWAL OF ARTERIAL BLOOD: CPT

## 2024-09-25 PROCEDURE — 6360000002 HC RX W HCPCS

## 2024-09-25 PROCEDURE — 93306 TTE W/DOPPLER COMPLETE: CPT | Performed by: INTERNAL MEDICINE

## 2024-09-25 PROCEDURE — 2700000000 HC OXYGEN THERAPY PER DAY

## 2024-09-25 PROCEDURE — 94640 AIRWAY INHALATION TREATMENT: CPT

## 2024-09-25 RX ADMIN — PIPERACILLIN AND TAZOBACTAM 4500 MG: 4; .5 INJECTION, POWDER, LYOPHILIZED, FOR SOLUTION INTRAVENOUS at 18:20

## 2024-09-25 RX ADMIN — APIXABAN 10 MG: 5 TABLET, FILM COATED ORAL at 13:22

## 2024-09-25 RX ADMIN — DEXAMETHASONE SODIUM PHOSPHATE 6 MG: 10 INJECTION INTRAMUSCULAR; INTRAVENOUS at 02:59

## 2024-09-25 RX ADMIN — DOXYCYCLINE 100 MG: 100 INJECTION, POWDER, LYOPHILIZED, FOR SOLUTION INTRAVENOUS at 13:21

## 2024-09-25 RX ADMIN — PIPERACILLIN AND TAZOBACTAM 4500 MG: 4; .5 INJECTION, POWDER, LYOPHILIZED, FOR SOLUTION INTRAVENOUS at 10:13

## 2024-09-25 RX ADMIN — IPRATROPIUM BROMIDE AND ALBUTEROL SULFATE 1 DOSE: 2.5; .5 SOLUTION RESPIRATORY (INHALATION) at 15:01

## 2024-09-25 RX ADMIN — IPRATROPIUM BROMIDE AND ALBUTEROL SULFATE 1 DOSE: 2.5; .5 SOLUTION RESPIRATORY (INHALATION) at 22:41

## 2024-09-25 RX ADMIN — IPRATROPIUM BROMIDE AND ALBUTEROL SULFATE 1 DOSE: 2.5; .5 SOLUTION RESPIRATORY (INHALATION) at 06:31

## 2024-09-25 RX ADMIN — DOXYCYCLINE 100 MG: 100 INJECTION, POWDER, LYOPHILIZED, FOR SOLUTION INTRAVENOUS at 00:01

## 2024-09-25 RX ADMIN — IPRATROPIUM BROMIDE AND ALBUTEROL SULFATE 1 DOSE: 2.5; .5 SOLUTION RESPIRATORY (INHALATION) at 10:04

## 2024-09-25 RX ADMIN — Medication 10 ML: at 19:52

## 2024-09-25 RX ADMIN — IPRATROPIUM BROMIDE AND ALBUTEROL SULFATE 1 DOSE: 2.5; .5 SOLUTION RESPIRATORY (INHALATION) at 18:53

## 2024-09-25 RX ADMIN — IPRATROPIUM BROMIDE AND ALBUTEROL SULFATE 1 DOSE: 2.5; .5 SOLUTION RESPIRATORY (INHALATION) at 01:34

## 2024-09-25 RX ADMIN — APIXABAN 10 MG: 5 TABLET, FILM COATED ORAL at 19:47

## 2024-09-25 RX ADMIN — HEPARIN SODIUM AND DEXTROSE 16 UNITS/KG/HR: 10000; 5 INJECTION INTRAVENOUS at 06:51

## 2024-09-25 RX ADMIN — PIPERACILLIN AND TAZOBACTAM 4500 MG: 4; .5 INJECTION, POWDER, LYOPHILIZED, FOR SOLUTION INTRAVENOUS at 03:00

## 2024-09-25 NOTE — PROGRESS NOTES
Department of Internal Medicine  PN    PCP: Joel Pagan MD  Admitting Physician: Dr. Galindo  Consultants:   Date of Service: 9/23/2024    CHIEF COMPLAINT:  sob/hemoptysis    HISTORY OF PRESENT ILLNESS:    Patient is 89-year-old male who presented to the ED with hemoptysis, shortness of breath and fever.  Patient has a history of throat cancer has completed treatment for this.  He does have tracheostomy in place.  He does not take anything by mouth and has PEG tube in place for tube feeds ,.  He admits to feeling more short of breath recently.  He admits to bouts of hemoptysis which she has had in the past when diagnosed with pneumonia.  He admits to fever.  He denies any history of DVT or PE.     9/24/2024  Patient seen and examined on monitored bed.  Patient's wife is at the bedside and case discussed.  Patient denies any current chest pain, abdominal pain.  Patient was aerosol trach mask in place on 7 L.  BUN/creatinine 13/0.5 with normal electrolytes.  Mild elevation of transaminase with a WBC 5.8 and hemoglobin 14.3.  Platelet count is 92.  Patient is positive for COVID-19.  Temperature is 99.6 with a heart of 64 blood pressure 110/51 with an O2 sat 94% on 8 L nasal cannula..  CTA of the lungs showed pulmonary embolism in the medial basilar segment artery of the left lower lobe.  Mild elevation of the RV/LV ratio but not suggestive of right ventricular strain.  Moderate bronchial thickening in lower lobes from bronchitis with a left greater than right.  Mild patchy interstitial infiltrate both lower lobes.  Patient wife states that the patient has been going to different hospitals including [facility], [facility], [facility].  This is the first admission in the [facility].    9/25/2024  Patient seen examined on PCU.  Patient's wife is at the bedside and case discussed.  She states that the patient is not getting his tube feedings as he supposed to.  BUN/creatinine 17/0.6 with normal  electrolytes.  Transaminase elevated with a WBC 6.5 and hemoglobin 14.  Platelet count is 103.  Temperature 97.8 with heart rate 83 and blood pressure 115/51.  O2 sat 98% on 8 L trach mask.  Chest x-ray today showed similar left lower lobe consolidation.  Overall the patient seems to be doing moderately better.  Patient is no respiratory distress on trach mask today.    PAST MEDICAL Hx:  Past Medical History:   Diagnosis Date    Cancer (HCC)     History of blood transfusion        PAST SURGICAL Hx:   Past Surgical History:   Procedure Laterality Date    PEG W/TRACHEOSTOMY PLACEMENT      TRACHEOSTOMY         FAMILY Hx:  Family History   Problem Relation Age of Onset    Heart Failure Mother     Cancer Father        HOME MEDICATIONS:  Prior to Admission medications    Not on File       ALLERGIES:  Patient has no known allergies.    SOCIAL Hx:  Social History     Socioeconomic History    Marital status:      Spouse name: Not on file    Number of children: Not on file    Years of education: Not on file    Highest education level: Not on file   Occupational History    Not on file   Tobacco Use    Smoking status: Former     Current packs/day: 1.00     Types: Cigarettes    Smokeless tobacco: Never   Vaping Use    Vaping status: Never Used   Substance and Sexual Activity    Alcohol use: Not Currently    Drug use: Not Currently    Sexual activity: Yes   Other Topics Concern    Not on file   Social History Narrative    Not on file     Social Determinants of Health     Financial Resource Strain: Not on file   Food Insecurity: Not on file   Transportation Needs: Not on file   Physical Activity: Not on file   Stress: Not on file   Social Connections: Not on file   Intimate Partner Violence: Not on file   Housing Stability: Not on file       ROS: Positive in bold  General:   Denies chills, fatigue, fever, malaise, night sweats or weight loss    Psychological:   Denies anxiety, disorientation or hallucinations    ENT:

## 2024-09-25 NOTE — CONSULTS
Comprehensive Nutrition Assessment    Type and Reason for Visit:  Initial, Consult (Tube Feedings Order and Managment)    Nutrition Recommendations/Plan:   Continue NPO status   Provide tube feeding recommendations/orders per MD Consult.    Recommend continue home regimen Nestle Compleat 1.5 bolus feedings 250ml - 4x/day with 30ml flush q4h. Regimen provides 1000ml TV, 1500kcal, 68g protein and 760ml water (940ml including flush) which meets % of estimated protein needs at goal rate.     Recommend 60ml water flush before and after each bolus (1420ml water per day total)        Malnutrition Assessment:  Malnutrition Status:  Insufficient data (09/25/24 0948)    Context:  Chronic Illness     Findings of the 6 clinical characteristics of malnutrition:  Energy Intake:  No significant decrease in energy intake  Weight Loss:  Unable to assess (d/t lack of wt hx per EMR)     Body Fat Loss:  Unable to assess (COVID iso)     Muscle Mass Loss:  Unable to assess (COVID iso)    Fluid Accumulation:  No significant fluid accumulation     Strength:  Not Performed    Nutrition Assessment:    Pt admit w/ hemoptysis & acute on chronic resp failure d/t COVID-19, PNA, and pulmonary embolism. PMHx of laryngeal CA s/p laryngectomy & trach/PEG, hx of dysphagia, prostate CA. Pt has regular diet ordered but does not take PO, will provide tube feeding recs/orders & monitor nutrition progression.    Nutrition Related Findings:    abd WDL, no edema noted, I/O's WDL, trach/PEG, COVID(+) isolation, pt/family does tube feedings themselves and brings in product - Compleat 1.5, bolus 250ml 4x/day Wound Type: Wound Consult Pending, Pressure Injury, Unstageable (L&R buttocks)       Current Nutrition Intake & Therapies:    Average Meal Intake: NPO  Average Supplements Intake: NPO  ADULT DIET; Regular  ADULT TUBE FEEDING; PEG; Other Tube Feeding (specify); nestle complete nutrition with fiber 1.5; Bolus; Other (specify); 0800, 1200, 1600,

## 2024-09-25 NOTE — ACP (ADVANCE CARE PLANNING)
Advance Care Planning   Healthcare Decision Maker:    Primary Decision Maker: Franca De Leon - [facility] - [phone]

## 2024-09-25 NOTE — CARE COORDINATION
Case Management Assessment  Initial Evaluation    Date/Time of Evaluation: 9/25/2024 12:09 PM  Assessment Completed by: Karine Batista RN    If patient is discharged prior to next notation, then this note serves as note for discharge by case management.    Patient Name: Juan De Leon                   YOB: 1935  Diagnosis: Pulmonary embolism on left (HCC) [I26.99]  Pneumonia due to infectious organism, unspecified laterality, unspecified part of lung [J18.9]  COVID-19 [U07.1]                   Date / Time: 9/23/2024  4:32 PM    Patient Admission Status: Inpatient   Readmission Risk (Low < 19, Mod (19-27), High > 27): Readmission Risk Score: 11.9    Current PCP: Joel Pagan MD  PCP verified by CM? Yes (Joel Pagan)    Chart Reviewed: Yes      History Provided by: Spouse (pts wife Franca)  Patient Orientation: Alert and Oriented    Patient Cognition: Alert    Hospitalization in the last 30 days (Readmission):  No    If yes, Readmission Assessment in CM Navigator will be completed.    Advance Directives:      Code Status: Full Code   Patient's Primary Decision Maker is: Legal Next of Kin    Primary Decision Maker: Franca De Leon - Spouse - [phone]    Discharge Planning:    Patient lives with:   Type of Home:    Primary Care Giver: Self  Patient Support Systems include: Spouse/Significant Other, Children   Current Financial resources:    Current community resources:    Current services prior to admission:              Current DME:              Type of Home Care services:       ADLS  Prior functional level: Independent in ADLs/IADLs  Current functional level: Independent in ADLs/IADLs        Family can provide assistance at DC: Yes  Would you like Case Management to discuss the discharge plan with any other family members/significant others, and if so, who? Yes (pts wife Franca)  Plans to Return to Present Housing: Yes  Other Identified Issues/Barriers to RETURNING to current housing:

## 2024-09-25 NOTE — PROGRESS NOTES
[place]  PULMONARY:   Decreased breath sounds ,  No Wheezing, No Rales, No Rhonchi      No noted egophony  ABDOMEN:     + PEG tube, Soft, non-tender. BS normal. No R/R/G  EXT:    No deformities .  No clubbing.       No lower extremity edema, No venous stasis  PULSE:   Appears equal and palpable.  PSYCHIATRIC:  Seems appropriate, No acute psychosis  MS:    No fractures, No gross weakness  NEUROLOGIC:   The clinical assessment is non-focal     DATA: IMAGING & TESTING:     LABORATORY TESTS:    CBC with Differential:    Lab Results   Component Value Date/Time    WBC 6.5 09/25/2024 08:20 AM    RBC 4.30 09/25/2024 08:20 AM    HGB 14.0 09/25/2024 08:20 AM    HCT 42.7 09/25/2024 08:20 AM    PLT 92 09/24/2024 05:56 AM    MCV 99.3 09/25/2024 08:20 AM    MCH 32.6 09/25/2024 08:20 AM    MCHC 32.8 09/25/2024 08:20 AM    RDW 13.4 09/25/2024 08:20 AM    LYMPHOPCT 4 09/25/2024 08:20 AM    MONOPCT 1 09/25/2024 08:20 AM    EOSPCT 0 09/25/2024 08:20 AM    BASOPCT 0 09/25/2024 08:20 AM    MONOSABS 0.06 09/25/2024 08:20 AM    LYMPHSABS 0.23 09/25/2024 08:20 AM    EOSABS 0.00 09/25/2024 08:20 AM    BASOSABS 0.00 09/25/2024 08:20 AM     CMP:    Lab Results   Component Value Date/Time     09/25/2024 08:20 AM    K 4.2 09/25/2024 08:20 AM     09/25/2024 08:20 AM    CO2 22 09/25/2024 08:20 AM    BUN 17 09/25/2024 08:20 AM    CREATININE 0.6 09/25/2024 08:20 AM    LABGLOM >90 09/25/2024 08:20 AM    GLUCOSE 164 09/25/2024 08:20 AM    CALCIUM 8.5 09/25/2024 08:20 AM    BILITOT 0.4 09/25/2024 08:20 AM    ALKPHOS 114 09/25/2024 08:20 AM    AST 44 09/25/2024 08:20 AM    ALT 47 09/25/2024 08:20 AM        PRO-BNP:   Lab Results   Component Value Date    PROBNP 1,594 (H) 09/24/2024      ABGs:   Lab Results   Component Value Date/Time    PH 7.456 09/25/2024 07:00 AM    PO2 84.8 09/25/2024 07:00 AM    PCO2 32.5 09/25/2024 07:00 AM     Hemoglobin A1C: No components found for: \"HGBA1C\"    IMAGING:  Imaging tests were completed and reviewed and

## 2024-09-25 NOTE — PROGRESS NOTES
ABG drawn x 1 from Right Radial. Patient had NormalAllen's Test.  Patient was on 35% O2 via tracheostomy mask  8L at time of puncture. Pressure held for 5.  No bleeding or bruising noted at puncture site.  Patient tolerated procedure well.      Performed by Celia Thompson RCP

## 2024-09-26 LAB
ALBUMIN SERPL-MCNC: 3.2 G/DL (ref 3.5–5.2)
ALP SERPL-CCNC: 119 U/L (ref 40–129)
ALT SERPL-CCNC: 46 U/L (ref 0–40)
ANION GAP SERPL CALCULATED.3IONS-SCNC: 9 MMOL/L (ref 7–16)
AST SERPL-CCNC: 35 U/L (ref 0–39)
BASOPHILS # BLD: 0 K/UL (ref 0–0.2)
BASOPHILS NFR BLD: 0 % (ref 0–2)
BILIRUB SERPL-MCNC: 0.6 MG/DL (ref 0–1.2)
BUN SERPL-MCNC: 16 MG/DL (ref 6–23)
CALCIUM SERPL-MCNC: 9 MG/DL (ref 8.6–10.2)
CHLORIDE SERPL-SCNC: 103 MMOL/L (ref 98–107)
CO2 SERPL-SCNC: 26 MMOL/L (ref 22–29)
CREAT SERPL-MCNC: 0.6 MG/DL (ref 0.7–1.2)
EOSINOPHIL # BLD: 0 K/UL (ref 0.05–0.5)
EOSINOPHILS RELATIVE PERCENT: 0 % (ref 0–6)
ERYTHROCYTE [DISTWIDTH] IN BLOOD BY AUTOMATED COUNT: 13.7 % (ref 11.5–15)
GFR, ESTIMATED: >90 ML/MIN/1.73M2
GLUCOSE SERPL-MCNC: 109 MG/DL (ref 74–99)
HCT VFR BLD AUTO: 44.2 % (ref 37–54)
HGB BLD-MCNC: 14.5 G/DL (ref 12.5–16.5)
IMM GRANULOCYTES # BLD AUTO: 0.03 K/UL (ref 0–0.58)
IMM GRANULOCYTES NFR BLD: 0 % (ref 0–5)
LYMPHOCYTES NFR BLD: 0.83 K/UL (ref 1.5–4)
LYMPHOCYTES RELATIVE PERCENT: 12 % (ref 20–42)
MCH RBC QN AUTO: 32.4 PG (ref 26–35)
MCHC RBC AUTO-ENTMCNC: 32.8 G/DL (ref 32–34.5)
MCV RBC AUTO: 98.7 FL (ref 80–99.9)
MONOCYTES NFR BLD: 0.57 K/UL (ref 0.1–0.95)
MONOCYTES NFR BLD: 8 % (ref 2–12)
NEUTROPHILS NFR BLD: 80 % (ref 43–80)
NEUTS SEG NFR BLD: 5.8 K/UL (ref 1.8–7.3)
PLATELET # BLD AUTO: 143 K/UL (ref 130–450)
PMV BLD AUTO: 12.2 FL (ref 7–12)
POTASSIUM SERPL-SCNC: 4.1 MMOL/L (ref 3.5–5)
PROT SERPL-MCNC: 7.1 G/DL (ref 6.4–8.3)
RBC # BLD AUTO: 4.48 M/UL (ref 3.8–5.8)
SODIUM SERPL-SCNC: 138 MMOL/L (ref 132–146)
WBC OTHER # BLD: 7.2 K/UL (ref 4.5–11.5)

## 2024-09-26 PROCEDURE — 2580000003 HC RX 258: Performed by: INTERNAL MEDICINE

## 2024-09-26 PROCEDURE — 94640 AIRWAY INHALATION TREATMENT: CPT

## 2024-09-26 PROCEDURE — 36415 COLL VENOUS BLD VENIPUNCTURE: CPT

## 2024-09-26 PROCEDURE — 80053 COMPREHEN METABOLIC PANEL: CPT

## 2024-09-26 PROCEDURE — 6360000002 HC RX W HCPCS: Performed by: INTERNAL MEDICINE

## 2024-09-26 PROCEDURE — 6370000000 HC RX 637 (ALT 250 FOR IP): Performed by: INTERNAL MEDICINE

## 2024-09-26 PROCEDURE — 2060000000 HC ICU INTERMEDIATE R&B

## 2024-09-26 PROCEDURE — 97161 PT EVAL LOW COMPLEX 20 MIN: CPT | Performed by: PHYSICAL THERAPIST

## 2024-09-26 PROCEDURE — 85025 COMPLETE CBC W/AUTO DIFF WBC: CPT

## 2024-09-26 PROCEDURE — 2500000003 HC RX 250 WO HCPCS: Performed by: INTERNAL MEDICINE

## 2024-09-26 PROCEDURE — 6370000000 HC RX 637 (ALT 250 FOR IP): Performed by: NURSE PRACTITIONER

## 2024-09-26 PROCEDURE — 97165 OT EVAL LOW COMPLEX 30 MIN: CPT

## 2024-09-26 PROCEDURE — 97530 THERAPEUTIC ACTIVITIES: CPT | Performed by: PHYSICAL THERAPIST

## 2024-09-26 RX ORDER — DEXAMETHASONE SODIUM PHOSPHATE 4 MG/ML
4 INJECTION, SOLUTION INTRA-ARTICULAR; INTRALESIONAL; INTRAMUSCULAR; INTRAVENOUS; SOFT TISSUE EVERY 24 HOURS
Status: DISCONTINUED | OUTPATIENT
Start: 2024-09-27 | End: 2024-09-28

## 2024-09-26 RX ADMIN — PIPERACILLIN AND TAZOBACTAM 4500 MG: 4; .5 INJECTION, POWDER, LYOPHILIZED, FOR SOLUTION INTRAVENOUS at 17:51

## 2024-09-26 RX ADMIN — DOXYCYCLINE 100 MG: 100 INJECTION, POWDER, LYOPHILIZED, FOR SOLUTION INTRAVENOUS at 13:51

## 2024-09-26 RX ADMIN — PIPERACILLIN AND TAZOBACTAM 4500 MG: 4; .5 INJECTION, POWDER, LYOPHILIZED, FOR SOLUTION INTRAVENOUS at 02:00

## 2024-09-26 RX ADMIN — Medication 10 ML: at 20:08

## 2024-09-26 RX ADMIN — DOXYCYCLINE 100 MG: 100 INJECTION, POWDER, LYOPHILIZED, FOR SOLUTION INTRAVENOUS at 00:48

## 2024-09-26 RX ADMIN — IPRATROPIUM BROMIDE AND ALBUTEROL SULFATE 1 DOSE: 2.5; .5 SOLUTION RESPIRATORY (INHALATION) at 13:37

## 2024-09-26 RX ADMIN — APIXABAN 10 MG: 5 TABLET, FILM COATED ORAL at 20:07

## 2024-09-26 RX ADMIN — IPRATROPIUM BROMIDE AND ALBUTEROL SULFATE 1 DOSE: 2.5; .5 SOLUTION RESPIRATORY (INHALATION) at 22:09

## 2024-09-26 RX ADMIN — DOXYCYCLINE 100 MG: 100 INJECTION, POWDER, LYOPHILIZED, FOR SOLUTION INTRAVENOUS at 23:15

## 2024-09-26 RX ADMIN — APIXABAN 10 MG: 5 TABLET, FILM COATED ORAL at 10:28

## 2024-09-26 RX ADMIN — PIPERACILLIN AND TAZOBACTAM 4500 MG: 4; .5 INJECTION, POWDER, LYOPHILIZED, FOR SOLUTION INTRAVENOUS at 10:31

## 2024-09-26 RX ADMIN — IPRATROPIUM BROMIDE AND ALBUTEROL SULFATE 1 DOSE: 2.5; .5 SOLUTION RESPIRATORY (INHALATION) at 18:52

## 2024-09-26 RX ADMIN — IPRATROPIUM BROMIDE AND ALBUTEROL SULFATE 1 DOSE: 2.5; .5 SOLUTION RESPIRATORY (INHALATION) at 02:22

## 2024-09-26 RX ADMIN — IPRATROPIUM BROMIDE AND ALBUTEROL SULFATE 1 DOSE: 2.5; .5 SOLUTION RESPIRATORY (INHALATION) at 09:33

## 2024-09-26 RX ADMIN — DEXAMETHASONE SODIUM PHOSPHATE 6 MG: 10 INJECTION INTRAMUSCULAR; INTRAVENOUS at 05:51

## 2024-09-26 RX ADMIN — IPRATROPIUM BROMIDE AND ALBUTEROL SULFATE 1 DOSE: 2.5; .5 SOLUTION RESPIRATORY (INHALATION) at 06:18

## 2024-09-26 ASSESSMENT — PAIN SCALES - GENERAL: PAINLEVEL_OUTOF10: 0

## 2024-09-26 NOTE — FLOWSHEET NOTE
Inpatient Wound Care    Admit Date: 9/23/2024  4:32 PM    Reason for consult:  buttocks    Significant history:    Past Medical History:   Diagnosis Date    Cancer (HCC)     History of blood transfusion        Wound history:      Findings:     09/26/24 1028   Wound   No Date First Assessed or Time First Assessed found.   Wound Outcome: Other (Comment)   Wound Etiology Pressure Stage 2   Wound Length (cm) 5 cm   Wound Width (cm) 4 cm   Wound Depth (cm) 0.2 cm   Wound Surface Area (cm^2) 20 cm^2   Change in Wound Size % (l*w) 33.33   Wound Volume (cm^3) 4 cm^3   Drainage Amount Scant (moist but unmeasurable)   Drainage Description Serous   Odor None         Impression:  stage 2 pressure ulcer    Interventions in place:  using mepilex    Plan:  Cont with mepilex  Inst re pressure relief  Chair pad      Pamela Cardenas RN 9/26/2024 10:32 AM

## 2024-09-26 NOTE — PROGRESS NOTES
Physician Progress Note      PATIENT:               NAHED KLINE  CSN #:                  [id number]  :                       1935  ADMIT DATE:       2024 4:32 PM  DISCH DATE:  RESPONDING  PROVIDER #:        Akhil Galindo DO          QUERY TEXT:    Pt admitted with Pulmonary embolism, Aspiration pneumonia and COVID-19   infection. Pt noted on admission to have Axillary temp 100.6, WBC 3.8, CRP 37,   Procalcitonin .24, -115, RR 20-23. If possible, please document in the   progress notes and discharge summary if you are evaluating and /or treating   any of the following:    The medical record reflects the following:  Risk Factors: aspiration pneumonia, COVID-19 infection, pulmonary embolism  Clinical Indicators: On admission: Axillary temp 100.6, WBC 3.8, CRP 37,   Procalcitonin .24, -115, RR 20-23  Treatment: IV Rocephin, IV Decadron, IV Doxycycline, IVFB 1L, Duonebs, CXR,   CTA chest, labs and monitoring    Thank you,  Radha Buck   Clinical Documentation Improvement Specialist  W: [phone]  Options provided:  -- Sepsis, present on admission due to COVID-19 infection and aspiration   pneumonia  -- Sepsis present on admission due to COVID-19 infection  -- Sepsis present on admission due to aspiration pneumonia  -- Other - I will add my own diagnosis  -- Disagree - Not applicable / Not valid  -- Disagree - Clinically unable to determine / Unknown  -- Refer to Clinical Documentation Reviewer    PROVIDER RESPONSE TEXT:    See pulmonology note    Query created by: Radha Buck on 2024 10:44 AM      Electronically signed by:  Akhil Galindo DO 2024 2:15 PM

## 2024-09-26 NOTE — PROGRESS NOTES
OCCUPATIONAL THERAPY INITIAL EVALUATION    [facility]  [address] [place]        Date:2024                                                  Patient Name: Juan De Leon    MRN: [id number]    : 1935    Room: [address] [place] [address]      Evaluating OT: Derrick Erwin OTR/L; #[id number]     Referring Provider and Specific Provider Orders/Date:      24  OT eval and treat  Start:  24,   End:  24,   ONE TIME,   Standing Count:  1 Occurrences,   R         Akhil Galindo, DO        Placement Recommendation: Home       Diagnosis:   1. Pulmonary embolism on left (HCC)    2. COVID-19    3. Pneumonia due to infectious organism, unspecified laterality, unspecified part of lung         Surgery: None      Pertinent Medical History:       Past Medical History:   Diagnosis Date    Cancer (HCC)     History of blood transfusion          Past Surgical History:   Procedure Laterality Date    PEG W/TRACHEOSTOMY PLACEMENT      TRACHEOSTOMY          Precautions:  Fall Risk, COVID-19 Droplet Plus, PEG, Up with Assistance, history of throat cancer, tracheostomy      Assessment of current deficits:     [x] Functional mobility  [x]ADLs  [x] Strength               []Cognition    [x] Functional transfers   [x] IADLs         [] Safety Awareness   [x]Endurance    [] Fine Coordination              [x] Balance      [] Vision/perception   []Sensation     []Gross Motor Coordination  [] ROM  [] Delirium                   [] Motor Control     OT PLAN OF CARE   OT POC based on physician orders, patient diagnosis and results of clinical assessment    Frequency/Duration 1-3 days/wk for 2 weeks PRN     Specific OT Treatment Interventions to include:   * Instruction/training on adapted ADL techniques and AE recommendations to increase functional independence within precautions       * Training on energy conservation strategies, correct breathing pattern and techniques to  [place]    Bathing Supervision  Modified [place]    Toileting Supervision   Modified [place]    Bed Mobility  Supine to sit: N/T   Sit to supine: N/T   Rolling: N/T    Seated in bedside chair    Supine to sit: Modified [place]   Sit to supine: Modified [place]   Rolling:Modified [place]     Functional Transfers Supervision from bedside chair sit to stand.   Transfer training with verbal cues for hand placement throughout session to improve safety.   Modified [place]    Functional Mobility Supervision with no AD; Supervision with 2 steps; Limited due to   Independent    Balance Sitting:     Static: good     Dynamic: good   Standing: fair  with no AD  Sitting:     Static: good     Dynamic: good   Standing: good  with no AD   Activity Tolerance fair   good    Visual/  Perceptual Glasses: Yes                Hand Dominance: R     AROM (PROM) Strength Additional Info:  Goal:   RUE  WFL 4/5 good  and wfl FMC/dexterity noted during ADL tasks   Improve overall RUE strength  for participation in functional tasks   LUE WFL 2-/5  Shoulder flexion  4/5  Elbow flexion good  and wfl FMC/dexterity noted during ADL tasks   Improve overall LUE strength  for participation in functional tasks      Vitals:  HR at rest: 88 bpm HR with activity: 123 bpm    SpO2 at rest: 96% SpO2 with activity: 89% SpO2 at end of session: 93%     Hearing: WFL   Sensation:  No c/o numbness or tingling  Tone: WFL   Edema: None    Comments: Nursing approved therapy session. Upon arrival the patient was seated in bedside chair.  At end of session, patient was seated in bedside chair with call light and phone within reach, all lines and tubes intact.  Overall patient demonstrated decreased independence and safety during completion of ADL/functional transfer/mobility tasks.  Pt would benefit from continued skilled OT to increase safety and independence with completion of ADL/IADL tasks for functional independence and

## 2024-09-26 NOTE — PROGRESS NOTES
[facility]  Department of Internal Medicine  Division of Pulmonary, Critical Care and Sleep Medicine  Progress Note      Eugene Hamilton Mary DO Jose Alexander, APRN-CNP  Petra Cuong, APRN-CNP  ICU Intensivist Attestation:    I saw, examined, and discussed the patient with Petra Hutchins APRCHELY-ACNP and agree with her assessment and plan.    The patient has been improving daily.  He does occasionally get congested but is able to cough up secretions by himself.  He knows the technique by which to do this.    In fact, he coughed up some secretions for me which were a slightly cloudy white and minimally creamy color.  It did not appear infected.    Saturation has improved nicely and he is currently at 92 to 97% on 28% FiO2.  From the pulmonary standpoint, the patient appears to be able to be discharged tomorrow if he maintains saturation and is otherwise stable from the respiratory standpoint.    Electronically signed by Jett Mccollum MD on 9/26/2024 at 4:49 PM      Subjective:   Patient seen and examined at the bedside.  He was seen sitting up on the bedside chair.  He has been weaned down to 28% FiO2 on JULIAN.  He denies any respiratory complaints.  Wife is present at the bedside.  They are questioning when he could be discharged home.    OBJECTIVE:     PHYSICAL EXAM:   VITALS:   Vitals:    09/26/24 0036 09/26/24 0223 09/26/24 0551 09/26/24 0747   BP:   (!) 114/55 129/61   Pulse: 80  75 83   Resp:   17 18   Temp:   98.2 °F (36.8 °C) 97.6 °F (36.4 °C)   TempSrc:   Axillary Axillary   SpO2:  94% 94% 97%   Weight:       Height:            Intake/Output Summary (Last 24 hours) at 9/26/2024 1104  Last data filed at 9/25/2024 1600  Gross per 24 hour   Intake --   Output 175 ml   Net -175 ml        CONSTITUTIONAL:   Ill-appearing, thin, A&O x 3, NAD  SKIN:     No rash, No

## 2024-09-26 NOTE — PROGRESS NOTES
Department of Internal Medicine  PN    PCP: Joel Pagan MD  Admitting Physician: Dr. Galindo  Consultants:   Date of Service: 9/23/2024    CHIEF COMPLAINT:  sob/hemoptysis    HISTORY OF PRESENT ILLNESS:    Patient is 89-year-old male who presented to the ED with hemoptysis, shortness of breath and fever.  Patient has a history of throat cancer has completed treatment for this.  He does have tracheostomy in place.  He does not take anything by mouth and has PEG tube in place for tube feeds ,.  He admits to feeling more short of breath recently.  He admits to bouts of hemoptysis which she has had in the past when diagnosed with pneumonia.  He admits to fever.  He denies any history of DVT or PE.     9/24/2024  Patient seen and examined on monitored bed.  Patient's wife is at the bedside and case discussed.  Patient denies any current chest pain, abdominal pain.  Patient was aerosol trach mask in place on 7 L.  BUN/creatinine 13/0.5 with normal electrolytes.  Mild elevation of transaminase with a WBC 5.8 and hemoglobin 14.3.  Platelet count is 92.  Patient is positive for COVID-19.  Temperature is 99.6 with a heart of 64 blood pressure 110/51 with an O2 sat 94% on 8 L nasal cannula..  CTA of the lungs showed pulmonary embolism in the medial basilar segment artery of the left lower lobe.  Mild elevation of the RV/LV ratio but not suggestive of right ventricular strain.  Moderate bronchial thickening in lower lobes from bronchitis with a left greater than right.  Mild patchy interstitial infiltrate both lower lobes.  Patient wife states that the patient has been going to different hospitals including [facility], [facility], [facility].  This is the first admission in the [facility].    9/25/2024  Patient seen examined on PCU.  Patient's wife is at the bedside and case discussed.  She states that the patient is not getting his tube feedings as he supposed to.  BUN/creatinine 17/0.6 with normal  pneumonia/pneumonitis   Mild patchy interstitial infiltrate in the dependent portions of both   lower lobes, probably atelectasis but cannot exclude aspiration or early   pneumonia.   Moderate bronchial wall thickening lower lobes from bronchitis, left   greater than right.  Mucous is seen opacifying some of the bronchi of both   lower lobes.   Transaminitis  PEG tube and tracheostomy placed  History of prostate cancer  History of laryngeal cancer  Moderate L1 compression fracture with moderate superior endplate fracture.   These are probably old.   GERD      Patient presented to the ED with shortness of breath and hemoptysis.  Patient found to be acutely hypoxic.  CT of the chest revealed PE and pneumonia.  Patient started on heparin drip.  Echocardiogram ordered.  Patient replaced on IV antibiotics.  Patient given IV fluids and nebulized breathing treatments ordered.v   tested positive for COVID-19.   COVID 19 biomarkers ordered. Decadron ordred.    Home medication reviewed  Consult pulmonology with acute hypoxic respiratory failure, PE, positive COVID    CMP, CBC in a.m.      Akhil Galindo, DO  12:58 PM  9/26/2024

## 2024-09-26 NOTE — PROGRESS NOTES
2nd rep in room, on 4 liters O2   performed exercises      Therapeutic Exercises:  ankle pumps, heel raises, long arc quad, and seated marching  x 20 reps.       At end of session, patient in chair with spouse present call light and phone within reach,  all lines and tubes intact, nursing notified.      Patient would benefit from continued skilled Physical Therapy to improve functional independence and quality of life.         Patient's/ family goals   home    Time in  1057  Time out  1127    Total Treatment Time  10 minutes    Evaluation time includes thorough review of current medical information, gathering information on past medical history/social history and prior level of function, completion of standardized testing/informal observation of tasks, assessment of data, and development of Plan of care and goals.     CPT codes:  Low Complexity PT evaluation (00425)  Therapeutic activities (06140)   10 minutes  1 unit(s)    Maya Guajardo, PT

## 2024-09-27 LAB
ALBUMIN SERPL-MCNC: 3.4 G/DL (ref 3.5–5.2)
ALP SERPL-CCNC: 136 U/L (ref 40–129)
ALT SERPL-CCNC: 51 U/L (ref 0–40)
ANION GAP SERPL CALCULATED.3IONS-SCNC: 11 MMOL/L (ref 7–16)
AST SERPL-CCNC: 38 U/L (ref 0–39)
BASOPHILS # BLD: 0.01 K/UL (ref 0–0.2)
BASOPHILS NFR BLD: 0 % (ref 0–2)
BILIRUB SERPL-MCNC: 0.7 MG/DL (ref 0–1.2)
BUN SERPL-MCNC: 14 MG/DL (ref 6–23)
CALCIUM SERPL-MCNC: 9.3 MG/DL (ref 8.6–10.2)
CHLORIDE SERPL-SCNC: 100 MMOL/L (ref 98–107)
CO2 SERPL-SCNC: 27 MMOL/L (ref 22–29)
CREAT SERPL-MCNC: 0.6 MG/DL (ref 0.7–1.2)
EKG ATRIAL RATE: 94 BPM
EKG P AXIS: 72 DEGREES
EKG P-R INTERVAL: 178 MS
EKG Q-T INTERVAL: 352 MS
EKG QRS DURATION: 72 MS
EKG QTC CALCULATION (BAZETT): 440 MS
EKG R AXIS: -32 DEGREES
EKG T AXIS: -4 DEGREES
EKG VENTRICULAR RATE: 94 BPM
EOSINOPHIL # BLD: 0 K/UL (ref 0.05–0.5)
EOSINOPHILS RELATIVE PERCENT: 0 % (ref 0–6)
ERYTHROCYTE [DISTWIDTH] IN BLOOD BY AUTOMATED COUNT: 13.7 % (ref 11.5–15)
GFR, ESTIMATED: >90 ML/MIN/1.73M2
GLUCOSE SERPL-MCNC: 129 MG/DL (ref 74–99)
HCT VFR BLD AUTO: 46.2 % (ref 37–54)
HGB BLD-MCNC: 15 G/DL (ref 12.5–16.5)
IMM GRANULOCYTES # BLD AUTO: 0.04 K/UL (ref 0–0.58)
IMM GRANULOCYTES NFR BLD: 1 % (ref 0–5)
LYMPHOCYTES NFR BLD: 0.78 K/UL (ref 1.5–4)
LYMPHOCYTES RELATIVE PERCENT: 9 % (ref 20–42)
MCH RBC QN AUTO: 32.3 PG (ref 26–35)
MCHC RBC AUTO-ENTMCNC: 32.5 G/DL (ref 32–34.5)
MCV RBC AUTO: 99.4 FL (ref 80–99.9)
MONOCYTES NFR BLD: 0.38 K/UL (ref 0.1–0.95)
MONOCYTES NFR BLD: 5 % (ref 2–12)
NEUTROPHILS NFR BLD: 86 % (ref 43–80)
NEUTS SEG NFR BLD: 7.19 K/UL (ref 1.8–7.3)
PLATELET # BLD AUTO: 165 K/UL (ref 130–450)
PMV BLD AUTO: 12.4 FL (ref 7–12)
POTASSIUM SERPL-SCNC: 3.8 MMOL/L (ref 3.5–5)
PROT SERPL-MCNC: 7.7 G/DL (ref 6.4–8.3)
RBC # BLD AUTO: 4.65 M/UL (ref 3.8–5.8)
SODIUM SERPL-SCNC: 138 MMOL/L (ref 132–146)
T SPOT TB TEST: NORMAL
WBC OTHER # BLD: 8.4 K/UL (ref 4.5–11.5)

## 2024-09-27 PROCEDURE — 2700000000 HC OXYGEN THERAPY PER DAY

## 2024-09-27 PROCEDURE — 2580000003 HC RX 258: Performed by: INTERNAL MEDICINE

## 2024-09-27 PROCEDURE — 2500000003 HC RX 250 WO HCPCS: Performed by: INTERNAL MEDICINE

## 2024-09-27 PROCEDURE — 2060000000 HC ICU INTERMEDIATE R&B

## 2024-09-27 PROCEDURE — 80053 COMPREHEN METABOLIC PANEL: CPT

## 2024-09-27 PROCEDURE — 6370000000 HC RX 637 (ALT 250 FOR IP): Performed by: NURSE PRACTITIONER

## 2024-09-27 PROCEDURE — 36415 COLL VENOUS BLD VENIPUNCTURE: CPT

## 2024-09-27 PROCEDURE — 99232 SBSQ HOSP IP/OBS MODERATE 35: CPT | Performed by: INTERNAL MEDICINE

## 2024-09-27 PROCEDURE — 94640 AIRWAY INHALATION TREATMENT: CPT

## 2024-09-27 PROCEDURE — 85025 COMPLETE CBC W/AUTO DIFF WBC: CPT

## 2024-09-27 PROCEDURE — 6370000000 HC RX 637 (ALT 250 FOR IP): Performed by: INTERNAL MEDICINE

## 2024-09-27 PROCEDURE — 6360000002 HC RX W HCPCS: Performed by: NURSE PRACTITIONER

## 2024-09-27 PROCEDURE — 6360000002 HC RX W HCPCS: Performed by: INTERNAL MEDICINE

## 2024-09-27 PROCEDURE — 97530 THERAPEUTIC ACTIVITIES: CPT

## 2024-09-27 RX ADMIN — PIPERACILLIN AND TAZOBACTAM 4500 MG: 4; .5 INJECTION, POWDER, LYOPHILIZED, FOR SOLUTION INTRAVENOUS at 09:36

## 2024-09-27 RX ADMIN — Medication 10 ML: at 09:20

## 2024-09-27 RX ADMIN — PIPERACILLIN AND TAZOBACTAM 4500 MG: 4; .5 INJECTION, POWDER, LYOPHILIZED, FOR SOLUTION INTRAVENOUS at 03:12

## 2024-09-27 RX ADMIN — DEXAMETHASONE SODIUM PHOSPHATE 4 MG: 4 INJECTION, SOLUTION INTRAMUSCULAR; INTRAVENOUS at 03:09

## 2024-09-27 RX ADMIN — IPRATROPIUM BROMIDE AND ALBUTEROL SULFATE 1 DOSE: 2.5; .5 SOLUTION RESPIRATORY (INHALATION) at 18:12

## 2024-09-27 RX ADMIN — IPRATROPIUM BROMIDE AND ALBUTEROL SULFATE 1 DOSE: 2.5; .5 SOLUTION RESPIRATORY (INHALATION) at 06:40

## 2024-09-27 RX ADMIN — IPRATROPIUM BROMIDE AND ALBUTEROL SULFATE 1 DOSE: 2.5; .5 SOLUTION RESPIRATORY (INHALATION) at 01:57

## 2024-09-27 RX ADMIN — APIXABAN 10 MG: 5 TABLET, FILM COATED ORAL at 09:20

## 2024-09-27 RX ADMIN — APIXABAN 10 MG: 5 TABLET, FILM COATED ORAL at 20:15

## 2024-09-27 RX ADMIN — IPRATROPIUM BROMIDE AND ALBUTEROL SULFATE 1 DOSE: 2.5; .5 SOLUTION RESPIRATORY (INHALATION) at 22:12

## 2024-09-27 RX ADMIN — Medication 10 ML: at 20:16

## 2024-09-27 RX ADMIN — IPRATROPIUM BROMIDE AND ALBUTEROL SULFATE 1 DOSE: 2.5; .5 SOLUTION RESPIRATORY (INHALATION) at 09:53

## 2024-09-27 RX ADMIN — DOXYCYCLINE 100 MG: 100 INJECTION, POWDER, LYOPHILIZED, FOR SOLUTION INTRAVENOUS at 13:55

## 2024-09-27 RX ADMIN — PIPERACILLIN AND TAZOBACTAM 4500 MG: 4; .5 INJECTION, POWDER, LYOPHILIZED, FOR SOLUTION INTRAVENOUS at 17:58

## 2024-09-27 NOTE — PROGRESS NOTES
Eval/treatment? Yes yes To be met in 3 days   Pain level Left wrist, nursing  notified possible infiltration, in to change IV line 0/10    Bed Mobility  Using rails and head of bed elevated:       Rolling: Not assessed patient in chair     Using rails and head of bed elevated:     Rolling: Not assessed patient in chair   Supine to sit: Not assessed patient in chair   Sit to supine: Not assessed patient in chair   Scooting: Not assessed patient in chair    Rolling: Independent    Supine to sit: Independent    Sit to supine: Independent    Scooting: Independent     Transfers Sit to stand: Supervision  Cues for hand placement and safety  Sit to stand: Supervision     Sit to stand: Independent     Ambulation     1 x 40 feet, 1 x 80 feet using  no device with Supervision    for safety and O2 line management , Patient with unsteady gait, no loss of balance, and cues for safety and pacing 6 x 15 feet using  no device with Supervision    cues for safety and pacing     100 feet using  no device with Independent      Stair negotiation: ascended and descended   Not assessed     2 steps, with rail, Supervision        ROM Within functional limits    Increase range of motion 10% of affected joints    Strength BUE:  refer to OT eval  RLE:  4-/5  LLE:  4-/5  Increase strength in affected mm groups by 1/3 grade   Balance Sitting EOB:  not assessed    Dynamic Standing:  fair + Sitting EOB: good in the chair  Dynamic Standing: good    Sitting EOB:  good    Dynamic Standing: good       Patient is Alert & Oriented x person, place, time, and situation and follows directions    Sensation:  Patient  denies numbness/tingling   Edema:  no   Endurance: fair  +    Vitals: tracheostomy with supplemental oxygen 5 liters  Blood Pressure at rest  Blood Pressure during session    Heart Rate at rest  Heart Rate during session    SPO2 at rest 96%  SPO2 during session 82% on room air; patient disconnecting his O2 line; after ambulation reconnecting  his O2 line on 5L was 97%     Patient education  Patient educated on role of Physical Therapy, risks of immobility, safety and plan of care,  importance of mobility while in hospital , and O2 line management and safety      Patient response to education:   Pt verbalized understanding Pt demonstrated skill Pt requires further education in this area   Yes Partial Yes      Treatment:  Patient practiced and was instructed/facilitated in the following treatment: Patient in chair at start of tx session, stood, ambulated in the room, and back to the chair. Pt educated on the importance of mobility, maintaining strength, endurance, pacing, and safety.     Therapeutic Exercises:  not performed    At end of session, patient in chair with spouse present call light and phone within reach,  all lines and tubes intact, nursing notified.      Patient would benefit from continued skilled Physical Therapy to improve functional independence and quality of life.         Patient's/ family goals   home    Time in  13:20  Time out 13:44    Total Treatment Time  24 minutes        CPT codes:    Therapeutic activities (68867)   24 minutes  2 unit(s)    Dalton Long  [facility]  LIC # [id number]

## 2024-09-27 NOTE — PROGRESS NOTES
Department of Internal Medicine  PN    PCP: Joel Pagan MD  Admitting Physician: Dr. Galindo  Consultants:   Date of Service: 9/23/2024    CHIEF COMPLAINT:  sob/hemoptysis    HISTORY OF PRESENT ILLNESS:    Patient is 89-year-old male who presented to the ED with hemoptysis, shortness of breath and fever.  Patient has a history of throat cancer has completed treatment for this.  He does have tracheostomy in place.  He does not take anything by mouth and has PEG tube in place for tube feeds ,.  He admits to feeling more short of breath recently.  He admits to bouts of hemoptysis which she has had in the past when diagnosed with pneumonia.  He admits to fever.  He denies any history of DVT or PE.     9/24/2024  Patient seen and examined on monitored bed.  Patient's wife is at the bedside and case discussed.  Patient denies any current chest pain, abdominal pain.  Patient was aerosol trach mask in place on 7 L.  BUN/creatinine 13/0.5 with normal electrolytes.  Mild elevation of transaminase with a WBC 5.8 and hemoglobin 14.3.  Platelet count is 92.  Patient is positive for COVID-19.  Temperature is 99.6 with a heart of 64 blood pressure 110/51 with an O2 sat 94% on 8 L nasal cannula..  CTA of the lungs showed pulmonary embolism in the medial basilar segment artery of the left lower lobe.  Mild elevation of the RV/LV ratio but not suggestive of right ventricular strain.  Moderate bronchial thickening in lower lobes from bronchitis with a left greater than right.  Mild patchy interstitial infiltrate both lower lobes.  Patient wife states that the patient has been going to different hospitals including [facility], [facility], [facility].  This is the first admission in the [facility].    9/25/2024  Patient seen examined on PCU.  Patient's wife is at the bedside and case discussed.  She states that the patient is not getting his tube feedings as he supposed to.  BUN/creatinine 17/0.6 with normal

## 2024-09-27 NOTE — CARE COORDINATION
9/27/24 1418 CM note: covid (+) 9/23/24. Hx laryngeal cancer with neck metastases in combination with his history of prostate cancer, pt follows with oncology at . Trach mask 5L/FIO2 28%, peg/TF. (+) PE- started on eliquis. Unstageable coccyx wound, wound care nurse consulted.  Discharge plan is home and pt/family declines need for HHC. NO home O2. IF HOME O2 IS NEEDED WILL NEED QUALIFYING DOCUMENTATION ON CHART AND O2 SCRIPT. Pts wife choiced for [employer] if needed, [facility] following. Pt resides with his wife and dtr (54y.o) in a ranch home, 1[address]2 [address]. He is independent with mobility, dressing, and sponge bathing. His DME includes a nebulizer and he gets his TF supplies through [facility]. Provided pt with free 30 day eliquis card and  contact information for the [facility] low copay program. Pts wife will provide transport home. CM will follow. Electronically signed by Karine Batista RN on 9/27/2024 at 2:26 PM

## 2024-09-27 NOTE — PROGRESS NOTES
[facility]  Department of Internal Medicine  Division of Pulmonary, Critical Care and Sleep Medicine  Progress Note      Eugene ROBBIN Hamilton Mary DO Jose Alexander, APRN-CNP  Petra Cuong, APRN-CNP    ICU Intensivist Attestation:    I saw, examined, and discussed the patient with Petra Hutchins APRN-ACNP and agree with her assessment and plan.    The patient is growing Klebsiella and Serratia from his tracheal secretions.  However, sensitivities are not back yet.  He is otherwise doing quite nicely and is made a great deal of progress.  His chest today is clearer than it was yesterday.  He is much less congested.  He seems to be responding to the Zosyn but we will readjust this once sensitivities come back if necessary.    The patient is tolerating a lower dose of Decadron.  This can be reduced further over the next day or 2.  He is approaching the point from the pulmonary standpoint where his discharge can be accomplished at any time as long as his antibiotics are adequately prescribed.    Electronically signed by Jett Mccollum MD on 9/27/2024 at 4:55 PM    Subjective:   Patient seen and examined at bedside.  He was seen on 28% FiO2.  Her respiratory status is unchanged.  Respiratory culture growing serratia marcescens and klebsiella oxytoca     OBJECTIVE:     PHYSICAL EXAM:   VITALS:   Vitals:    09/26/24 2209 09/27/24 0000 09/27/24 0400 09/27/24 0926   BP:    132/60   Pulse:  79 80 90   Resp:    20   Temp:  98 °F (36.7 °C) 98 °F (36.7 °C) 98 °F (36.7 °C)   TempSrc:  Axillary Axillary Axillary   SpO2: 95% (!) 89% 93% 95%   Weight:       Height:            Intake/Output Summary (Last 24 hours) at 9/27/2024 1351  Last data filed at 9/27/2024 0511  Gross per 24 hour   Intake 250 ml   Output 800 ml   Net -550 ml        CONSTITUTIONAL:   Ill-appearing, thin, A&O x 3,  NAD  SKIN:     No rash, No suspicious lesions, No skin discoloration  HEENT:     EOMI, MMM, No thrush  NECK:    Tracheostomy intact, No bruits, No JVP appreciated  CV:      Sinus,  No murmur, No rubs, No gallops  PULMONARY:   Decreased breath sounds ,  No Wheezing, No Rales, No Rhonchi      No noted egophony  ABDOMEN:     + PEG tube, Soft, non-tender. BS normal. No R/R/G  EXT:    No deformities .  No clubbing.       No lower extremity edema, No venous stasis  PULSE:   Appears equal and palpable.  PSYCHIATRIC:  Seems appropriate, No acute psychosis  MS:    No fractures, No gross weakness  NEUROLOGIC:   The clinical assessment is non-focal     DATA: IMAGING & TESTING:     LABORATORY TESTS:    CBC with Differential:    Lab Results   Component Value Date/Time    WBC 8.4 09/27/2024 07:58 AM    RBC 4.65 09/27/2024 07:58 AM    HGB 15.0 09/27/2024 07:58 AM    HCT 46.2 09/27/2024 07:58 AM     09/27/2024 07:58 AM    MCV 99.4 09/27/2024 07:58 AM    MCH 32.3 09/27/2024 07:58 AM    MCHC 32.5 09/27/2024 07:58 AM    RDW 13.7 09/27/2024 07:58 AM    LYMPHOPCT 9 09/27/2024 07:58 AM    MONOPCT 5 09/27/2024 07:58 AM    EOSPCT 0 09/27/2024 07:58 AM    BASOPCT 0 09/27/2024 07:58 AM    MONOSABS 0.38 09/27/2024 07:58 AM    LYMPHSABS 0.78 09/27/2024 07:58 AM    EOSABS 0.00 09/27/2024 07:58 AM    BASOSABS 0.01 09/27/2024 07:58 AM     CMP:    Lab Results   Component Value Date/Time     09/27/2024 07:58 AM    K 3.8 09/27/2024 07:58 AM     09/27/2024 07:58 AM    CO2 27 09/27/2024 07:58 AM    BUN 14 09/27/2024 07:58 AM    CREATININE 0.6 09/27/2024 07:58 AM    LABGLOM >90 09/27/2024 07:58 AM    GLUCOSE 129 09/27/2024 07:58 AM    CALCIUM 9.3 09/27/2024 07:58 AM    BILITOT 0.7 09/27/2024 07:58 AM    ALKPHOS 136 09/27/2024 07:58 AM    AST 38 09/27/2024 07:58 AM    ALT 51 09/27/2024 07:58 AM        PRO-BNP:   Lab Results   Component Value Date    PROBNP 1,594 (H) 09/24/2024      ABGs:   Lab Results   Component Value Date/Time    PH

## 2024-09-27 NOTE — PROGRESS NOTES
Physician Progress Note      PATIENT:               NAHED KLINE  CSN #:                  [id number]  :                       1935  ADMIT DATE:       2024 4:32 PM  DISCH DATE:  RESPONDING  PROVIDER #:        Akhil Galindo DO          QUERY TEXT:    Pt admitted with Pulmonary embolism, Aspiration pneumonia and COVID-19   infection. Pt noted on admission to have Axillary temp 100.6, WBC 3.8, CRP 37,   Procalcitonin .24, -115, RR 20-23. If possible, please document in the   progress notes and discharge summary if you are evaluating and /or treating   any of the following:    The medical record reflects the following:  Risk Factors: aspiration pneumonia, COVID-19 infection, pulmonary embolism  Clinical Indicators: On admission: Axillary temp 100.6, WBC 3.8, CRP 37,   Procalcitonin .24, -115, RR 20-23  Treatment: IV Rocephin, IV Decadron, IV Doxycycline, IVFB 1L, Duonebs, CXR,   CTA chest, labs and monitoring    Thank you,  Radha Buck   Clinical Documentation Improvement Specialist  W: [phone]  Options provided:  -- Sepsis, present on admission due to COVID-19 infection and aspiration   pneumonia  -- Sepsis present on admission due to COVID-19 infection  -- Sepsis present on admission due to aspiration pneumonia  -- Other - I will add my own diagnosis  -- Disagree - Not applicable / Not valid  -- Disagree - Clinically unable to determine / Unknown  -- Refer to Clinical Documentation Reviewer    PROVIDER RESPONSE TEXT:    See pulmonary note    Query created by: Radha Buck on 2024 9:49 AM      Electronically signed by:  Akhil Galindo DO 2024 11:19 AM

## 2024-09-28 VITALS
OXYGEN SATURATION: 94 % | WEIGHT: 130 LBS | RESPIRATION RATE: 18 BRPM | HEIGHT: 69 IN | TEMPERATURE: 97 F | DIASTOLIC BLOOD PRESSURE: 52 MMHG | BODY MASS INDEX: 19.26 KG/M2 | SYSTOLIC BLOOD PRESSURE: 105 MMHG | HEART RATE: 80 BPM

## 2024-09-28 LAB
ALBUMIN SERPL-MCNC: 2.9 G/DL (ref 3.5–5.2)
ALP SERPL-CCNC: 118 U/L (ref 40–129)
ALT SERPL-CCNC: 54 U/L (ref 0–40)
ANION GAP SERPL CALCULATED.3IONS-SCNC: 11 MMOL/L (ref 7–16)
AST SERPL-CCNC: 48 U/L (ref 0–39)
BASOPHILS # BLD: 0.02 K/UL (ref 0–0.2)
BASOPHILS NFR BLD: 0 % (ref 0–2)
BILIRUB SERPL-MCNC: 0.6 MG/DL (ref 0–1.2)
BUN SERPL-MCNC: 16 MG/DL (ref 6–23)
CALCIUM SERPL-MCNC: 8.9 MG/DL (ref 8.6–10.2)
CHLORIDE SERPL-SCNC: 104 MMOL/L (ref 98–107)
CO2 SERPL-SCNC: 23 MMOL/L (ref 22–29)
CREAT SERPL-MCNC: 0.6 MG/DL (ref 0.7–1.2)
EOSINOPHIL # BLD: 0 K/UL (ref 0.05–0.5)
EOSINOPHILS RELATIVE PERCENT: 0 % (ref 0–6)
ERYTHROCYTE [DISTWIDTH] IN BLOOD BY AUTOMATED COUNT: 13.8 % (ref 11.5–15)
GFR, ESTIMATED: >90 ML/MIN/1.73M2
GLUCOSE SERPL-MCNC: 103 MG/DL (ref 74–99)
HCT VFR BLD AUTO: 45.9 % (ref 37–54)
HGB BLD-MCNC: 14.8 G/DL (ref 12.5–16.5)
IMM GRANULOCYTES # BLD AUTO: 0.05 K/UL (ref 0–0.58)
IMM GRANULOCYTES NFR BLD: 1 % (ref 0–5)
LYMPHOCYTES NFR BLD: 1.06 K/UL (ref 1.5–4)
LYMPHOCYTES RELATIVE PERCENT: 13 % (ref 20–42)
MCH RBC QN AUTO: 32.4 PG (ref 26–35)
MCHC RBC AUTO-ENTMCNC: 32.2 G/DL (ref 32–34.5)
MCV RBC AUTO: 100.4 FL (ref 80–99.9)
MICROORGANISM SPEC CULT: ABNORMAL
MICROORGANISM/AGENT SPEC: ABNORMAL
MONOCYTES NFR BLD: 0.37 K/UL (ref 0.1–0.95)
MONOCYTES NFR BLD: 5 % (ref 2–12)
NEUTROPHILS NFR BLD: 82 % (ref 43–80)
NEUTS SEG NFR BLD: 6.78 K/UL (ref 1.8–7.3)
PLATELET # BLD AUTO: 186 K/UL (ref 130–450)
PMV BLD AUTO: 12.1 FL (ref 7–12)
POTASSIUM SERPL-SCNC: 4.8 MMOL/L (ref 3.5–5)
PROT SERPL-MCNC: 6.8 G/DL (ref 6.4–8.3)
RBC # BLD AUTO: 4.57 M/UL (ref 3.8–5.8)
SODIUM SERPL-SCNC: 138 MMOL/L (ref 132–146)
SPECIMEN DESCRIPTION: ABNORMAL
WBC OTHER # BLD: 8.3 K/UL (ref 4.5–11.5)

## 2024-09-28 PROCEDURE — 6370000000 HC RX 637 (ALT 250 FOR IP): Performed by: NURSE PRACTITIONER

## 2024-09-28 PROCEDURE — 6370000000 HC RX 637 (ALT 250 FOR IP): Performed by: INTERNAL MEDICINE

## 2024-09-28 PROCEDURE — 85025 COMPLETE CBC W/AUTO DIFF WBC: CPT

## 2024-09-28 PROCEDURE — 2580000003 HC RX 258: Performed by: INTERNAL MEDICINE

## 2024-09-28 PROCEDURE — 2700000000 HC OXYGEN THERAPY PER DAY

## 2024-09-28 PROCEDURE — 2500000003 HC RX 250 WO HCPCS: Performed by: INTERNAL MEDICINE

## 2024-09-28 PROCEDURE — 94640 AIRWAY INHALATION TREATMENT: CPT

## 2024-09-28 PROCEDURE — 6360000002 HC RX W HCPCS: Performed by: INTERNAL MEDICINE

## 2024-09-28 PROCEDURE — 80053 COMPREHEN METABOLIC PANEL: CPT

## 2024-09-28 PROCEDURE — 36415 COLL VENOUS BLD VENIPUNCTURE: CPT

## 2024-09-28 PROCEDURE — 99233 SBSQ HOSP IP/OBS HIGH 50: CPT | Performed by: INTERNAL MEDICINE

## 2024-09-28 PROCEDURE — 6360000002 HC RX W HCPCS: Performed by: NURSE PRACTITIONER

## 2024-09-28 RX ORDER — LEVOFLOXACIN 500 MG/1
500 TABLET, FILM COATED ORAL DAILY
Qty: 7 TABLET | Refills: 0 | Status: SHIPPED | OUTPATIENT
Start: 2024-09-28 | End: 2024-10-05

## 2024-09-28 RX ORDER — IPRATROPIUM BROMIDE AND ALBUTEROL SULFATE 2.5; .5 MG/3ML; MG/3ML
3 SOLUTION RESPIRATORY (INHALATION) EVERY 4 HOURS PRN
Qty: 360 ML | Refills: 3 | Status: SHIPPED | OUTPATIENT
Start: 2024-09-28

## 2024-09-28 RX ORDER — LEVOFLOXACIN 500 MG/1
500 TABLET, FILM COATED ORAL DAILY
Status: DISCONTINUED | OUTPATIENT
Start: 2024-09-28 | End: 2024-09-28 | Stop reason: HOSPADM

## 2024-09-28 RX ORDER — DEXAMETHASONE 4 MG/1
4 TABLET ORAL DAILY
Qty: 5 TABLET | Refills: 0 | Status: SHIPPED | OUTPATIENT
Start: 2024-09-28 | End: 2024-10-03

## 2024-09-28 RX ORDER — DEXAMETHASONE 4 MG/1
4 TABLET ORAL DAILY
Status: DISCONTINUED | OUTPATIENT
Start: 2024-09-28 | End: 2024-09-28 | Stop reason: HOSPADM

## 2024-09-28 RX ADMIN — DOXYCYCLINE 100 MG: 100 INJECTION, POWDER, LYOPHILIZED, FOR SOLUTION INTRAVENOUS at 00:35

## 2024-09-28 RX ADMIN — IPRATROPIUM BROMIDE AND ALBUTEROL SULFATE 1 DOSE: 2.5; .5 SOLUTION RESPIRATORY (INHALATION) at 02:17

## 2024-09-28 RX ADMIN — DEXAMETHASONE SODIUM PHOSPHATE 4 MG: 4 INJECTION, SOLUTION INTRAMUSCULAR; INTRAVENOUS at 03:23

## 2024-09-28 RX ADMIN — DOXYCYCLINE 100 MG: 100 INJECTION, POWDER, LYOPHILIZED, FOR SOLUTION INTRAVENOUS at 14:37

## 2024-09-28 RX ADMIN — PIPERACILLIN AND TAZOBACTAM 4500 MG: 4; .5 INJECTION, POWDER, LYOPHILIZED, FOR SOLUTION INTRAVENOUS at 10:02

## 2024-09-28 RX ADMIN — Medication 10 ML: at 08:23

## 2024-09-28 RX ADMIN — LEVOFLOXACIN 500 MG: 500 TABLET, FILM COATED ORAL at 15:53

## 2024-09-28 RX ADMIN — PIPERACILLIN AND TAZOBACTAM 4500 MG: 4; .5 INJECTION, POWDER, LYOPHILIZED, FOR SOLUTION INTRAVENOUS at 03:25

## 2024-09-28 RX ADMIN — IPRATROPIUM BROMIDE AND ALBUTEROL SULFATE 1 DOSE: 2.5; .5 SOLUTION RESPIRATORY (INHALATION) at 06:31

## 2024-09-28 RX ADMIN — IPRATROPIUM BROMIDE AND ALBUTEROL SULFATE 1 DOSE: 2.5; .5 SOLUTION RESPIRATORY (INHALATION) at 13:02

## 2024-09-28 RX ADMIN — DEXAMETHASONE 4 MG: 4 TABLET ORAL at 15:53

## 2024-09-28 RX ADMIN — APIXABAN 10 MG: 5 TABLET, FILM COATED ORAL at 08:20

## 2024-09-28 RX ADMIN — IPRATROPIUM BROMIDE AND ALBUTEROL SULFATE 1 DOSE: 2.5; .5 SOLUTION RESPIRATORY (INHALATION) at 09:46

## 2024-09-28 ASSESSMENT — PAIN SCALES - GENERAL
PAINLEVEL_OUTOF10: 0
PAINLEVEL_OUTOF10: 0

## 2024-09-28 NOTE — PROGRESS NOTES
Hemoglobin A1C: No components found for: \"HGBA1C\"    IMAGING:  Imaging tests were completed and reviewed and discussed radiology and care team involved and reveals   Vascular duplex lower extremity venous bilateral  Result Date: 9/24/2024   No evidence of DVT in either lower extremity.     CTA PULMONARY W CONTRAST  Result Date: 9/23/2024  1. Pulmonary embolism, with occlusive thrombus in the medial basilar segmental artery of the left lower lobe. Clot burden is mild. 2. Mild elevation of the RV/LV ratio at 1.04 without flattening of the interventricular septum, findings not suggestive of right ventricular strain. This may represents mild chronic right heart failure. 3. Moderate bronchial wall thickening lower lobes from bronchitis, left greater than right.  Mucous is seen opacifying some of the bronchi of both lower lobes. 4. Mild patchy interstitial infiltrate in the dependent portions of both lower lobes, probably atelectasis but cannot exclude aspiration or early pneumonia. 5. Moderate L1 compression fracture with moderate superior endplate fracture. These are probably old.       Assessment:     Acute hypoxic respiratory failure  Pulmonary embolism  COVID-19 infection  Aspiration pneumonia, cultures growing serratia marcescens and klebsiella oxytoca   History of laryngeal cancer status post laryngectomy  Chronic tracheostomy and PEG tube  Dysphagia  History of Serratia pneumonia  History of prostate cancer        Plan:     Supplemental oxygen for sats 88 to 94%, his baseline is room air   Patient is currently on Eliquis for treatment for PE.  Levaquin 500 milligram daily for pneumonia for 7 days.  Decadron 4 mg daily for 7 days.  Lifelong anticoagulation is recommended.  Case will be discussed with primary team        Mary Moore MD        ICU/PULMONARY Staff Physician note of personal involvement in Care  As the attending physician, I certify that I personally reviewed the patient’s history  and personnally examined the patient to confirm the physical findings described above,  And that I reviewed the relevant imaging studies and available reports.  I also discussed the differential diagnosis and all of the proposed management plans with the patient and individuals accompanying the patient to this visit.  They had the opportunity to ask questions about the proposed management plans and to have those questions answered.

## 2024-09-28 NOTE — FLOWSHEET NOTE
Pulse ox was__97__% on room air at rest.  Ambulated patient on room air.  Oxygen saturation was ___91__% on room air while ambulating.  Recovery pulse ox was ___96__% on ___0__ liters.

## 2024-09-28 NOTE — PROGRESS NOTES
Department of Internal Medicine  PN    PCP: Joel Pagan MD  Admitting Physician: Dr. Galindo  Consultants:   Date of Service: 9/23/2024    CHIEF COMPLAINT:  sob/hemoptysis    HISTORY OF PRESENT ILLNESS:    Patient is 89-year-old male who presented to the ED with hemoptysis, shortness of breath and fever.  Patient has a history of throat cancer has completed treatment for this.  He does have tracheostomy in place.  He does not take anything by mouth and has PEG tube in place for tube feeds ,.  He admits to feeling more short of breath recently.  He admits to bouts of hemoptysis which she has had in the past when diagnosed with pneumonia.  He admits to fever.  He denies any history of DVT or PE.     9/24/2024  Patient seen and examined on monitored bed.  Patient's wife is at the bedside and case discussed.  Patient denies any current chest pain, abdominal pain.  Patient was aerosol trach mask in place on 7 L.  BUN/creatinine 13/0.5 with normal electrolytes.  Mild elevation of transaminase with a WBC 5.8 and hemoglobin 14.3.  Platelet count is 92.  Patient is positive for COVID-19.  Temperature is 99.6 with a heart of 64 blood pressure 110/51 with an O2 sat 94% on 8 L nasal cannula..  CTA of the lungs showed pulmonary embolism in the medial basilar segment artery of the left lower lobe.  Mild elevation of the RV/LV ratio but not suggestive of right ventricular strain.  Moderate bronchial thickening in lower lobes from bronchitis with a left greater than right.  Mild patchy interstitial infiltrate both lower lobes.  Patient wife states that the patient has been going to different hospitals including [facility], [facility], [facility].  This is the first admission in the [facility].    9/25/2024  Patient seen examined on PCU.  Patient's wife is at the bedside and case discussed.  She states that the patient is not getting his tube feedings as he supposed to.  BUN/creatinine 17/0.6 with normal  difficulty.    Musculoskeletal:   Denies joint pain, joint stiffness, joint swelling or muscle pain    Neurology:    Denies any headache or focal neurological deficits. No weakness or paresthesia.    Derm:    Denies any rashes, ulcers, or excoriations.  Denies bruising.      Extremities:   Denies any lower extremity swelling or edema.      PHYSICAL EXAM: Abnormal findings noted  VITALS:  Vitals:    09/28/24 0808   BP: (!) 115/57   Pulse: 68   Resp: 18   Temp: 97.3 °F (36.3 °C)   SpO2: 98%         CONSTITUTIONAL:    Awake, alert, cooperative, no apparent distress, and appears stated age     EYES:    EOMI, sclera clear, conjunctiva normal     ENT:    Normocephalic, atraumatic,  External ears without lesions.   Patient has tracheostomy in place     NECK:    Supple, symmetrical, trachea midline,, no JVD     HEMATOLOGIC/LYMPHATICS:    No cervical lymphadenopathy and no supraclavicular lymphadenopathy     LUNGS:    Symmetric. No increased work of breathing, good air exchange, clear to auscultation bilaterally, no wheezes, rhonchi, or rales,  Patient bilateral rhonchi     CARDIOVASCULAR:    Normal apical impulse, regular rate and rhythm, normal S1 and S2, no S3 or S4, and no murmur noted     ABDOMEN:    soft, non-distended, non-tender,  Patient is has PEG tube in place     MUSCULOSKELETAL:    There is no redness, warmth, or swelling of the joints.      NEUROLOGIC:    Awake, alert, oriented to name, place and time.       SKIN:    No bruising or bleeding.  No redness, warmth, or swelling     EXTREMITIES:    Peripheral pulses present.  No edema, cyanosis, or swelling.     LINES/CATHETERS     LABORATORY DATA:  CBC with Differential:    Lab Results   Component Value Date/Time    WBC 8.3 09/28/2024 07:38 AM    RBC 4.57 09/28/2024 07:38 AM    HGB 14.8 09/28/2024 07:38 AM    HCT 45.9 09/28/2024 07:38 AM     09/28/2024 07:38 AM    .4 09/28/2024 07:38 AM    MCH 32.4 09/28/2024 07:38 AM    MCHC 32.2 09/28/2024 07:38 AM

## 2024-09-28 NOTE — PLAN OF CARE
Problem: Discharge Planning  Goal: Discharge to home or other facility with appropriate resources  Outcome: Progressing  Flowsheets (Taken 9/28/2024 0800)  Discharge to home or other facility with appropriate resources: Identify barriers to discharge with patient and caregiver     Problem: Safety - Adult  Goal: Free from fall injury  9/28/2024 1405 by Moises Mendez, RN  Outcome: Progressing  9/28/2024 0635 by Geno Bacon, RN  Outcome: Progressing     Problem: Skin/Tissue Integrity  Goal: Absence of new skin breakdown  Description: 1.  Monitor for areas of redness and/or skin breakdown  2.  Assess vascular access sites hourly  3.  Every 4-6 hours minimum:  Change oxygen saturation probe site  4.  Every 4-6 hours:  If on nasal continuous positive airway pressure, respiratory therapy assess nares and determine need for appliance change or resting period.  9/28/2024 1405 by Moises Mendez, RN  Outcome: Progressing  9/28/2024 0635 by Geno Bacon, RN  Outcome: Progressing

## 2024-09-30 LAB — T SPOT TB TEST: NORMAL

## 2024-10-14 ENCOUNTER — OFFICE VISIT (OUTPATIENT)
Dept: PULMONOLOGY | Age: 89
End: 2024-10-14
Payer: MEDICARE

## 2024-10-14 VITALS
BODY MASS INDEX: 26.11 KG/M2 | HEART RATE: 111 BPM | DIASTOLIC BLOOD PRESSURE: 60 MMHG | OXYGEN SATURATION: 95 % | WEIGHT: 133 LBS | HEIGHT: 60 IN | SYSTOLIC BLOOD PRESSURE: 110 MMHG | TEMPERATURE: 97.4 F

## 2024-10-14 DIAGNOSIS — I26.93 SINGLE SUBSEGMENTAL PULMONARY EMBOLISM WITHOUT ACUTE COR PULMONALE (HCC): Primary | ICD-10-CM

## 2024-10-14 DIAGNOSIS — Z98.890 HISTORY OF TRACHEOSTOMY: ICD-10-CM

## 2024-10-14 DIAGNOSIS — Z85.21 HISTORY OF CANCER OF LARYNX: ICD-10-CM

## 2024-10-14 PROBLEM — C32.9 LARYNGEAL CANCER (HCC): Status: ACTIVE | Noted: 2023-09-17

## 2024-10-14 PROBLEM — K21.9 GASTROESOPHAGEAL REFLUX DISEASE: Status: ACTIVE | Noted: 2021-05-25

## 2024-10-14 PROBLEM — C61 MALIGNANT TUMOR OF PROSTATE (HCC): Status: ACTIVE | Noted: 2021-05-25

## 2024-10-14 PROBLEM — Z93.0 TRACHEOSTOMY PRESENT (HCC): Status: ACTIVE | Noted: 2023-01-20

## 2024-10-14 PROBLEM — R09.89 LABILE HYPERTENSION DUE TO CLINICAL ENVIRONMENT: Status: ACTIVE | Noted: 2021-06-29

## 2024-10-14 PROBLEM — J98.8 RESPIRATORY OBSTRUCTION: Status: ACTIVE | Noted: 2023-09-17

## 2024-10-14 PROBLEM — Z78.9 ON TUBE FEEDING DIET: Status: ACTIVE | Noted: 2023-01-20

## 2024-10-14 PROBLEM — R13.12 DYSPHAGIA, OROPHARYNGEAL PHASE: Status: ACTIVE | Noted: 2023-09-17

## 2024-10-14 PROBLEM — H65.20 CHRONIC SEROUS OTITIS MEDIA: Status: ACTIVE | Noted: 2023-09-17

## 2024-10-14 PROBLEM — R73.01 IMPAIRED FASTING GLUCOSE: Status: ACTIVE | Noted: 2021-06-29

## 2024-10-14 PROBLEM — J69.0 ASPIRATION PNEUMONIA (HCC): Status: ACTIVE | Noted: 2021-05-25

## 2024-10-14 PROBLEM — E04.1 THYROID NODULE: Status: ACTIVE | Noted: 2021-07-14

## 2024-10-14 PROBLEM — L92.9: Status: ACTIVE | Noted: 2023-09-17

## 2024-10-14 PROBLEM — E55.9 VITAMIN D DEFICIENCY: Status: ACTIVE | Noted: 2024-01-26

## 2024-10-14 PROCEDURE — 1036F TOBACCO NON-USER: CPT | Performed by: INTERNAL MEDICINE

## 2024-10-14 PROCEDURE — 1123F ACP DISCUSS/DSCN MKR DOCD: CPT | Performed by: INTERNAL MEDICINE

## 2024-10-14 PROCEDURE — 1111F DSCHRG MED/CURRENT MED MERGE: CPT | Performed by: INTERNAL MEDICINE

## 2024-10-14 PROCEDURE — G8427 DOCREV CUR MEDS BY ELIG CLIN: HCPCS | Performed by: INTERNAL MEDICINE

## 2024-10-14 PROCEDURE — 99204 OFFICE O/P NEW MOD 45 MIN: CPT | Performed by: INTERNAL MEDICINE

## 2024-10-14 PROCEDURE — G8419 CALC BMI OUT NRM PARAM NOF/U: HCPCS | Performed by: INTERNAL MEDICINE

## 2024-10-14 PROCEDURE — G8482 FLU IMMUNIZE ORDER/ADMIN: HCPCS | Performed by: INTERNAL MEDICINE

## 2024-10-14 RX ORDER — ALBUTEROL SULFATE 90 UG/1
1 INHALANT RESPIRATORY (INHALATION) EVERY 4 HOURS PRN
COMMUNITY

## 2024-10-14 RX ORDER — RABEPRAZOLE SODIUM 20 MG/1
20 TABLET, DELAYED RELEASE ORAL DAILY
COMMUNITY

## 2024-10-14 ASSESSMENT — ENCOUNTER SYMPTOMS
EYES NEGATIVE: 1
RESPIRATORY NEGATIVE: 1
ALLERGIC/IMMUNOLOGIC NEGATIVE: 1

## 2024-10-14 NOTE — PROGRESS NOTES
Patient to follow up with physician in one (1) year.   
  Neurological:      General: No focal deficit present.      Mental Status: He is alert and oriented to person, place, and time.          Assessment/Plan:   Diagnosis Orders   1. Single subsegmental pulmonary embolism without acute cor pulmonale (HCC)        2. History of cancer of larynx        3. History of tracheostomy          His PE was unprovoked, with his other co morbid condition it is reasonable to continue anticoagulant indefinitely. May continue Albuterol nebulizer treatment.      Follow up 1 year.   Electronically by Qiuque Waldron MD  on 10/14/24 at 2:31 PM EDT

## 2024-11-05 NOTE — PROGRESS NOTES
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 This obviously will be a lifetime issue.  His PEG tube is deteriorating.  The patient was instructed in proper flushing after meals.    Cerumen impaction on the left side which was addressed with a small instrument.     I will see him in 6 months.     Chief Complaint     Follow-up status post tracheotomy and PEG tube placement.  Status posttreatment of laryngeal cancer     History of Present Illness    This gentleman was seen in September 2021 at the request of his family physician. 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 For 4 years or so he has had some issues with episodes of pneumonia from aspiration. His swallowing has been more difficult and also the patient has noticed increased difficulty with breathing. 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 This was performed on September 30, 2021. The patient is here today for follow-up.  Since I last saw him he had COVID and came up with a blood clot in his lungs.  He is on Eliquis and unfortunately cannot afford it.  Otherwise he has not had any significant issues since I last saw him for his PEG tube replacement.    Physical Exam    Examination of the oral cavity and oropharynx is within normal limits.  There is good mobility of the tongue and palate.  Palpation of the parotid, neck, thyroid feel does not show any worrisome masses or adenopathies.  The tracheotomy tube is in proper position and well taking care of.  There is a fair amount of induration in his neck.  The ear examination shows impacted cerumen mainly on the  left side.  This was addressed with a small instrument.  That eardrum really looks abnormal on that side.    A flexible laryngoscopy was carried out. Under topical Xylocaine and Micky-Synephrine the scope was introduced through the nostril. The nasopharynx, base of tongue, hypopharynx, and larynx are visualized.  There is some significant scarring of the larynx with no vocal cord mobility and a very reduced airway.  I cannot appreciate any mucosal lesions.      The PEG site was examined.  The tube is in proper position.  There is no more granulation around the PEG site.  At the same time the tube is deteriorating.

## 2024-11-06 ENCOUNTER — APPOINTMENT (OUTPATIENT)
Dept: OTOLARYNGOLOGY | Facility: CLINIC | Age: 89
End: 2024-11-06
Payer: MEDICARE

## 2024-11-06 VITALS — BODY MASS INDEX: 21.03 KG/M2 | HEIGHT: 67 IN | WEIGHT: 134 LBS

## 2024-11-06 DIAGNOSIS — H61.22 IMPACTED CERUMEN OF LEFT EAR: ICD-10-CM

## 2024-11-06 DIAGNOSIS — C32.9 LARYNGEAL CANCER (MULTI): ICD-10-CM

## 2024-11-06 DIAGNOSIS — R13.12 DYSPHAGIA, OROPHARYNGEAL PHASE: ICD-10-CM

## 2024-11-06 DIAGNOSIS — J98.8 AIRWAY OBSTRUCTION: Primary | ICD-10-CM

## 2024-11-06 PROCEDURE — 1159F MED LIST DOCD IN RCRD: CPT | Performed by: OTOLARYNGOLOGY

## 2024-11-06 PROCEDURE — 1036F TOBACCO NON-USER: CPT | Performed by: OTOLARYNGOLOGY

## 2024-11-06 PROCEDURE — 1160F RVW MEDS BY RX/DR IN RCRD: CPT | Performed by: OTOLARYNGOLOGY

## 2024-11-06 PROCEDURE — 31575 DIAGNOSTIC LARYNGOSCOPY: CPT | Performed by: OTOLARYNGOLOGY

## 2024-11-06 PROCEDURE — 99213 OFFICE O/P EST LOW 20 MIN: CPT | Performed by: OTOLARYNGOLOGY

## 2024-11-06 PROCEDURE — 69210 REMOVE IMPACTED EAR WAX UNI: CPT | Performed by: OTOLARYNGOLOGY

## 2024-11-06 RX ORDER — APIXABAN 5 MG/1
TABLET, FILM COATED ORAL
COMMUNITY
Start: 2024-09-28

## 2025-05-06 NOTE — PROGRESS NOTES
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 This obviously will be a lifetime issue.  His PEG tube is deteriorating.  His next appointment will be at the [facility] to have the PEG tube changed.    Significantly granulation around the PEG site.  The granulation was removed and cauterized.  This was well-tolerated.  The tube is really falling apart and needs to be changed.  The patient will arrange for an appointment at the [facility] hospital where I can proceed to the tube change.    I will see him soon at the [facility] hospital.     Chief Complaint     Follow-up status post tracheotomy and PEG tube placement.  Status posttreatment of laryngeal cancer     History of Present Illness    This gentleman was seen in September 2021 at the request of his family physician. 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 This was performed on September 30, 2021. The patient is here today for follow-up.   His main issue has to do with some granulation tissue and bleeding around the PEG site.  He stopped his Eliquis because of that.    Physical Exam    Examination of the oral cavity and oropharynx is within normal limits.  There is good mobility of the tongue and palate.  Palpation of the parotid, neck, thyroid feel does not show any worrisome masses or adenopathies.  The tracheotomy tube is in proper position and well taking care of.  There is a fair amount of induration in his neck.  The ear examination is negative.  The eardrum on the left side does not look normal.    A flexible laryngoscopy was  carried out. Under topical Xylocaine and Micky-Synephrine the scope was introduced through the nostril. The nasopharynx, base of tongue, hypopharynx, and larynx are visualized.  There is some significant scarring of the larynx with no vocal cord mobility and a very reduced airway.  I cannot appreciate any mucosal lesions.      The PEG site was examined.  The tube is in proper position.  There is no more granulation around the PEG site.  This was cauterized, removed with a scissor and then recauterized.  At the same time the tube is deteriorating.  It definitely needs to be changed.

## 2025-05-07 ENCOUNTER — APPOINTMENT (OUTPATIENT)
Dept: OTOLARYNGOLOGY | Facility: CLINIC | Age: 89
End: 2025-05-07
Payer: MEDICARE

## 2025-05-07 VITALS — HEIGHT: 67 IN | WEIGHT: 133 LBS | BODY MASS INDEX: 20.88 KG/M2

## 2025-05-07 DIAGNOSIS — R13.12 DYSPHAGIA, OROPHARYNGEAL PHASE: ICD-10-CM

## 2025-05-07 DIAGNOSIS — C32.9 LARYNGEAL CANCER (MULTI): Primary | ICD-10-CM

## 2025-05-07 DIAGNOSIS — L92.9 ABNORMAL GRANULATION TISSUE OF ABDOMEN: ICD-10-CM

## 2025-05-07 DIAGNOSIS — J98.8 AIRWAY OBSTRUCTION: ICD-10-CM

## 2025-05-07 PROCEDURE — 1160F RVW MEDS BY RX/DR IN RCRD: CPT | Performed by: OTOLARYNGOLOGY

## 2025-05-07 PROCEDURE — 99213 OFFICE O/P EST LOW 20 MIN: CPT | Performed by: OTOLARYNGOLOGY

## 2025-05-07 PROCEDURE — 1159F MED LIST DOCD IN RCRD: CPT | Performed by: OTOLARYNGOLOGY

## 2025-05-07 PROCEDURE — 31575 DIAGNOSTIC LARYNGOSCOPY: CPT | Performed by: OTOLARYNGOLOGY

## 2025-05-07 PROCEDURE — 1036F TOBACCO NON-USER: CPT | Performed by: OTOLARYNGOLOGY

## 2025-05-07 PROCEDURE — 17250 CHEM CAUT OF GRANLTJ TISSUE: CPT | Performed by: OTOLARYNGOLOGY

## 2025-05-07 ASSESSMENT — PATIENT HEALTH QUESTIONNAIRE - PHQ9
2. FEELING DOWN, DEPRESSED OR HOPELESS: NOT AT ALL
1. LITTLE INTEREST OR PLEASURE IN DOING THINGS: NOT AT ALL
SUM OF ALL RESPONSES TO PHQ9 QUESTIONS 1 AND 2: 0

## 2025-07-23 ENCOUNTER — HOSPITAL ENCOUNTER (EMERGENCY)
Age: 89
Discharge: HOME | End: 2025-07-23
Attending: EMERGENCY MEDICINE
Payer: MEDICARE

## 2025-07-23 ENCOUNTER — APPOINTMENT (OUTPATIENT)
Dept: CT IMAGING | Age: 89
End: 2025-07-23
Payer: MEDICARE

## 2025-07-23 ENCOUNTER — APPOINTMENT (OUTPATIENT)
Dept: GENERAL RADIOLOGY | Age: 89
End: 2025-07-23
Payer: MEDICARE

## 2025-07-23 ENCOUNTER — APPOINTMENT (OUTPATIENT)
Dept: INTERVENTIONAL RADIOLOGY/VASCULAR | Age: 89
End: 2025-07-23
Payer: MEDICARE

## 2025-07-23 VITALS
RESPIRATION RATE: 18 BRPM | SYSTOLIC BLOOD PRESSURE: 136 MMHG | WEIGHT: 134 LBS | TEMPERATURE: 97.4 F | HEIGHT: 66 IN | HEART RATE: 100 BPM | OXYGEN SATURATION: 98 % | BODY MASS INDEX: 21.53 KG/M2 | DIASTOLIC BLOOD PRESSURE: 75 MMHG

## 2025-07-23 DIAGNOSIS — R04.2 HEMOPTYSIS: Primary | ICD-10-CM

## 2025-07-23 DIAGNOSIS — S93.402A SPRAIN OF LEFT ANKLE, UNSPECIFIED LIGAMENT, INITIAL ENCOUNTER: ICD-10-CM

## 2025-07-23 LAB
ANION GAP SERPL CALCULATED.3IONS-SCNC: 11 MMOL/L (ref 7–16)
BUN SERPL-MCNC: 19 MG/DL (ref 8–23)
CALCIUM SERPL-MCNC: 9.6 MG/DL (ref 8.8–10.2)
CHLORIDE SERPL-SCNC: 98 MMOL/L (ref 98–107)
CO2 SERPL-SCNC: 27 MMOL/L (ref 22–29)
CREAT SERPL-MCNC: 0.7 MG/DL (ref 0.7–1.2)
ERYTHROCYTE [DISTWIDTH] IN BLOOD BY AUTOMATED COUNT: 12.6 % (ref 11.5–15)
GFR, ESTIMATED: 87 ML/MIN/1.73M2
GLUCOSE SERPL-MCNC: 109 MG/DL (ref 74–99)
HCT VFR BLD AUTO: 42.4 % (ref 37–54)
HGB BLD-MCNC: 14.2 G/DL (ref 12.5–16.5)
MCH RBC QN AUTO: 32.4 PG (ref 26–35)
MCHC RBC AUTO-ENTMCNC: 33.5 G/DL (ref 32–34.5)
MCV RBC AUTO: 96.8 FL (ref 80–99.9)
PLATELET # BLD AUTO: 212 K/UL (ref 130–450)
PMV BLD AUTO: 12.3 FL (ref 7–12)
POTASSIUM SERPL-SCNC: 4.3 MMOL/L (ref 3.5–5.1)
RBC # BLD AUTO: 4.38 M/UL (ref 3.8–5.8)
SODIUM SERPL-SCNC: 137 MMOL/L (ref 136–145)
TROPONIN I SERPL HS-MCNC: 11 NG/L (ref 0–22)
WBC OTHER # BLD: 7.9 K/UL (ref 4.5–11.5)

## 2025-07-23 PROCEDURE — 93971 EXTREMITY STUDY: CPT

## 2025-07-23 PROCEDURE — 6360000004 HC RX CONTRAST MEDICATION: Performed by: RADIOLOGY

## 2025-07-23 PROCEDURE — 84484 ASSAY OF TROPONIN QUANT: CPT

## 2025-07-23 PROCEDURE — 93005 ELECTROCARDIOGRAM TRACING: CPT | Performed by: EMERGENCY MEDICINE

## 2025-07-23 PROCEDURE — 71275 CT ANGIOGRAPHY CHEST: CPT

## 2025-07-23 PROCEDURE — 80048 BASIC METABOLIC PNL TOTAL CA: CPT

## 2025-07-23 PROCEDURE — 85027 COMPLETE CBC AUTOMATED: CPT

## 2025-07-23 PROCEDURE — 73610 X-RAY EXAM OF ANKLE: CPT

## 2025-07-23 PROCEDURE — 99285 EMERGENCY DEPT VISIT HI MDM: CPT

## 2025-07-23 PROCEDURE — 96374 THER/PROPH/DIAG INJ IV PUSH: CPT

## 2025-07-23 PROCEDURE — 2580000003 HC RX 258: Performed by: EMERGENCY MEDICINE

## 2025-07-23 RX ORDER — IOPAMIDOL 755 MG/ML
70 INJECTION, SOLUTION INTRAVASCULAR
Status: COMPLETED | OUTPATIENT
Start: 2025-07-23 | End: 2025-07-23

## 2025-07-23 RX ORDER — 0.9 % SODIUM CHLORIDE 0.9 %
1000 INTRAVENOUS SOLUTION INTRAVENOUS ONCE
Status: COMPLETED | OUTPATIENT
Start: 2025-07-23 | End: 2025-07-23

## 2025-07-23 RX ADMIN — IOPAMIDOL 70 ML: 755 INJECTION, SOLUTION INTRAVENOUS at 12:20

## 2025-07-23 RX ADMIN — SODIUM CHLORIDE 1000 ML: 0.9 INJECTION, SOLUTION INTRAVENOUS at 11:28

## 2025-07-23 ASSESSMENT — PAIN - FUNCTIONAL ASSESSMENT: PAIN_FUNCTIONAL_ASSESSMENT: 0-10

## 2025-07-23 ASSESSMENT — PAIN DESCRIPTION - LOCATION: LOCATION: ANKLE;LEG

## 2025-07-23 ASSESSMENT — ENCOUNTER SYMPTOMS
VOMITING: 0
WHEEZING: 0
DIARRHEA: 0
COUGH: 1
SHORTNESS OF BREATH: 0
COLOR CHANGE: 0
ABDOMINAL PAIN: 0
NAUSEA: 0
CONSTIPATION: 0
CHEST TIGHTNESS: 0

## 2025-07-23 ASSESSMENT — PAIN DESCRIPTION - ORIENTATION: ORIENTATION: LEFT

## 2025-07-23 ASSESSMENT — PAIN SCALES - GENERAL: PAINLEVEL_OUTOF10: 5

## 2025-07-23 ASSESSMENT — LIFESTYLE VARIABLES
HOW OFTEN DO YOU HAVE A DRINK CONTAINING ALCOHOL: NEVER
HOW MANY STANDARD DRINKS CONTAINING ALCOHOL DO YOU HAVE ON A TYPICAL DAY: PATIENT DOES NOT DRINK

## 2025-07-23 ASSESSMENT — PAIN DESCRIPTION - DESCRIPTORS: DESCRIPTORS: ACHING;SQUEEZING;SORE

## 2025-07-23 NOTE — DISCHARGE INSTRUCTIONS
If symptoms worsen or you become short of breath please return the emergency department immediately.

## 2025-07-23 NOTE — ED PROVIDER NOTES
normal  Q Waves: none    Clinical Impression: no acute changes    Allynilesh ValenzuelaDO Evelio       --------------------------------------------- PAST HISTORY ---------------------------------------------  Past Medical History:  has a past medical history of Cancer (HCC) and History of blood transfusion.    Past Surgical History:  has a past surgical history that includes tracheostomy and PEG w/tracheostomy placement.    Social History:  reports that he quit smoking about 47 years ago. His smoking use included cigarettes. He started smoking about 69 years ago. He has a 22 pack-year smoking history. He has never used smokeless tobacco. He reports that he does not currently use alcohol. He reports that he does not currently use drugs.    Family History: family history includes Cancer in his father; Heart Failure in his mother.     The patient’s home medications have been reviewed.    Allergies: Latex    -------------------------------------------------- RESULTS -------------------------------------------------  Labs:  Results for orders placed or performed during the hospital encounter of 07/23/25   Basic metabolic panel   Result Value Ref Range    Sodium 137 136 - 145 mmol/L    Potassium 4.3 3.5 - 5.1 mmol/L    Chloride 98 98 - 107 mmol/L    CO2 27 22 - 29 mmol/L    Anion Gap 11 7 - 16 mmol/L    Glucose 109 (H) 74 - 99 mg/dL    BUN 19 8 - 23 mg/dL    Creatinine 0.7 0.7 - 1.2 mg/dL    Est Glom Filt Rate 87 >60 mL/min/1.73m2    Calcium 9.6 8.8 - 10.2 mg/dL   CBC   Result Value Ref Range    WBC 7.9 4.5 - 11.5 k/uL    RBC 4.38 3.80 - 5.80 m/uL    Hemoglobin 14.2 12.5 - 16.5 g/dL    Hematocrit 42.4 37.0 - 54.0 %    MCV 96.8 80.0 - 99.9 fL    MCH 32.4 26.0 - 35.0 pg    MCHC 33.5 32.0 - 34.5 g/dL    RDW 12.6 11.5 - 15.0 %    Platelets 212 130 - 450 k/uL    MPV 12.3 (H) 7.0 - 12.0 fL   Troponin   Result Value Ref Range    Troponin, High Sensitivity 11 0 - 22 ng/L       Radiology:  XR ANKLE LEFT (MIN 3 VIEWS)   Final Result

## 2025-07-23 NOTE — ED NOTES
Patient has been placed in a non monitored area at this time.  Physician and charge nurse are aware.

## 2025-07-26 LAB
EKG ATRIAL RATE: 113 BPM
EKG P AXIS: 76 DEGREES
EKG P-R INTERVAL: 174 MS
EKG Q-T INTERVAL: 308 MS
EKG QRS DURATION: 68 MS
EKG QTC CALCULATION (BAZETT): 422 MS
EKG R AXIS: -38 DEGREES
EKG T AXIS: 54 DEGREES
EKG VENTRICULAR RATE: 113 BPM

## 2025-07-26 PROCEDURE — 93010 ELECTROCARDIOGRAM REPORT: CPT | Performed by: INTERNAL MEDICINE

## 2025-08-07 NOTE — PROGRESS NOTES
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 This obviously will be a lifetime issue.  His PEG tube was changed today.    I will see him in 6 months.     Chief Complaint     Follow-up status post tracheotomy and PEG tube placement.  Status posttreatment of laryngeal cancer     History of Present Illness    This gentleman was seen in September 2021 at the request of his family physician. 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 This was performed on September 30, 2021.  He is here today to have his PEG tube changed.    Physical Exam    The PEG site was examined.  Tube was removed.  He still has reformed a significant amount of granulation.  This had to be excised and cauterized.  Using a dome replacement kit the tube was changed.  This was well-tolerated.  The tube was felt to be in good position with some gastric return.

## 2025-08-08 ENCOUNTER — OFFICE VISIT (OUTPATIENT)
Dept: OTOLARYNGOLOGY | Facility: HOSPITAL | Age: 89
End: 2025-08-08
Payer: MEDICARE

## 2025-08-08 VITALS — TEMPERATURE: 97.5 F | BODY MASS INDEX: 20.95 KG/M2 | HEIGHT: 67 IN | WEIGHT: 133.5 LBS

## 2025-08-08 DIAGNOSIS — R13.12 DYSPHAGIA, OROPHARYNGEAL PHASE: ICD-10-CM

## 2025-08-08 DIAGNOSIS — C32.9 LARYNGEAL CANCER (MULTI): Primary | ICD-10-CM

## 2025-08-08 DIAGNOSIS — L92.9 ABNORMAL GRANULATION TISSUE OF ABDOMEN: ICD-10-CM

## 2025-08-08 PROCEDURE — 43762 RPLC GTUBE NO REVJ TRC: CPT | Performed by: OTOLARYNGOLOGY

## 2025-08-08 PROCEDURE — 99213 OFFICE O/P EST LOW 20 MIN: CPT | Mod: 25 | Performed by: OTOLARYNGOLOGY

## 2025-08-08 PROCEDURE — 1160F RVW MEDS BY RX/DR IN RCRD: CPT | Performed by: OTOLARYNGOLOGY

## 2025-08-08 PROCEDURE — 1159F MED LIST DOCD IN RCRD: CPT | Performed by: OTOLARYNGOLOGY

## 2025-08-08 PROCEDURE — 17250 CHEM CAUT OF GRANLTJ TISSUE: CPT | Performed by: OTOLARYNGOLOGY

## 2025-08-08 PROCEDURE — 99213 OFFICE O/P EST LOW 20 MIN: CPT | Performed by: OTOLARYNGOLOGY

## 2025-08-08 ASSESSMENT — PATIENT HEALTH QUESTIONNAIRE - PHQ9
2. FEELING DOWN, DEPRESSED OR HOPELESS: NOT AT ALL
1. LITTLE INTEREST OR PLEASURE IN DOING THINGS: NOT AT ALL
SUM OF ALL RESPONSES TO PHQ9 QUESTIONS 1 & 2: 0

## 2025-08-09 ENCOUNTER — HOSPITAL ENCOUNTER (INPATIENT)
Age: 89
LOS: 2 days | Discharge: HOME | DRG: 194 | End: 2025-08-11
Admitting: INTERNAL MEDICINE
Payer: MEDICARE

## 2025-08-09 ENCOUNTER — APPOINTMENT (OUTPATIENT)
Dept: GENERAL RADIOLOGY | Age: 89
DRG: 194 | End: 2025-08-09
Payer: MEDICARE

## 2025-08-09 DIAGNOSIS — R53.1 GENERALIZED WEAKNESS: ICD-10-CM

## 2025-08-09 DIAGNOSIS — J18.9 PNEUMONIA OF LEFT LOWER LOBE DUE TO INFECTIOUS ORGANISM: Primary | ICD-10-CM

## 2025-08-09 DIAGNOSIS — E87.1 HYPONATREMIA: ICD-10-CM

## 2025-08-09 LAB
ALBUMIN SERPL-MCNC: 3 G/DL (ref 3.5–5.2)
ALP SERPL-CCNC: 171 U/L (ref 40–129)
ALT SERPL-CCNC: 44 U/L (ref 0–50)
ANION GAP SERPL CALCULATED.3IONS-SCNC: 11 MMOL/L (ref 7–16)
AST SERPL-CCNC: 70 U/L (ref 0–50)
B PARAP IS1001 DNA NPH QL NAA+NON-PROBE: NOT DETECTED
B PERT DNA SPEC QL NAA+PROBE: NOT DETECTED
BASOPHILS # BLD: 0.03 K/UL (ref 0–0.2)
BASOPHILS NFR BLD: 0 % (ref 0–2)
BILIRUB SERPL-MCNC: 0.5 MG/DL (ref 0–1.2)
BILIRUB UR QL STRIP: NEGATIVE
BUN SERPL-MCNC: 16 MG/DL (ref 8–23)
C PNEUM DNA NPH QL NAA+NON-PROBE: NOT DETECTED
CALCIUM SERPL-MCNC: 8.4 MG/DL (ref 8.8–10.2)
CHLORIDE SERPL-SCNC: 101 MMOL/L (ref 98–107)
CLARITY UR: CLEAR
CO2 SERPL-SCNC: 21 MMOL/L (ref 22–29)
COLOR UR: YELLOW
CREAT SERPL-MCNC: 0.6 MG/DL (ref 0.7–1.2)
EOSINOPHIL # BLD: 0 K/UL (ref 0.05–0.5)
EOSINOPHILS RELATIVE PERCENT: 0 % (ref 0–6)
ERYTHROCYTE [DISTWIDTH] IN BLOOD BY AUTOMATED COUNT: 13.3 % (ref 11.5–15)
FLUAV RNA NPH QL NAA+NON-PROBE: NOT DETECTED
FLUBV RNA NPH QL NAA+NON-PROBE: NOT DETECTED
GFR, ESTIMATED: >90 ML/MIN/1.73M2
GLUCOSE SERPL-MCNC: 113 MG/DL (ref 74–99)
GLUCOSE UR STRIP-MCNC: NEGATIVE MG/DL
HADV DNA NPH QL NAA+NON-PROBE: NOT DETECTED
HCOV 229E RNA NPH QL NAA+NON-PROBE: NOT DETECTED
HCOV HKU1 RNA NPH QL NAA+NON-PROBE: NOT DETECTED
HCOV NL63 RNA NPH QL NAA+NON-PROBE: NOT DETECTED
HCOV OC43 RNA NPH QL NAA+NON-PROBE: NOT DETECTED
HCT VFR BLD AUTO: 40.6 % (ref 37–54)
HGB BLD-MCNC: 13.5 G/DL (ref 12.5–16.5)
HGB UR QL STRIP.AUTO: NEGATIVE
HMPV RNA NPH QL NAA+NON-PROBE: NOT DETECTED
HPIV1 RNA NPH QL NAA+NON-PROBE: NOT DETECTED
HPIV2 RNA NPH QL NAA+NON-PROBE: NOT DETECTED
HPIV3 RNA NPH QL NAA+NON-PROBE: NOT DETECTED
HPIV4 RNA NPH QL NAA+NON-PROBE: NOT DETECTED
IMM GRANULOCYTES # BLD AUTO: 0.04 K/UL (ref 0–0.58)
IMM GRANULOCYTES NFR BLD: 0 % (ref 0–5)
KETONES UR STRIP-MCNC: 15 MG/DL
LACTATE BLDV-SCNC: 0.9 MMOL/L (ref 0.5–1.9)
LACTATE BLDV-SCNC: 1 MMOL/L (ref 0.5–1.9)
LEUKOCYTE ESTERASE UR QL STRIP: NEGATIVE
LYMPHOCYTES NFR BLD: 0.68 K/UL (ref 1.5–4)
LYMPHOCYTES RELATIVE PERCENT: 6 % (ref 20–42)
M PNEUMO DNA NPH QL NAA+NON-PROBE: NOT DETECTED
MCH RBC QN AUTO: 31.4 PG (ref 26–35)
MCHC RBC AUTO-ENTMCNC: 33.3 G/DL (ref 32–34.5)
MCV RBC AUTO: 94.4 FL (ref 80–99.9)
MONOCYTES NFR BLD: 0.89 K/UL (ref 0.1–0.95)
MONOCYTES NFR BLD: 7 % (ref 2–12)
NEUTROPHILS NFR BLD: 87 % (ref 43–80)
NEUTS SEG NFR BLD: 10.47 K/UL (ref 1.8–7.3)
NITRITE UR QL STRIP: NEGATIVE
PH UR STRIP: 8 [PH] (ref 5–8)
PLATELET # BLD AUTO: 178 K/UL (ref 130–450)
PMV BLD AUTO: 11.9 FL (ref 7–12)
POTASSIUM SERPL-SCNC: 4.4 MMOL/L (ref 3.5–5.1)
PROT SERPL-MCNC: 7 G/DL (ref 6.4–8.3)
PROT UR STRIP-MCNC: NEGATIVE MG/DL
RBC # BLD AUTO: 4.3 M/UL (ref 3.8–5.8)
RBC #/AREA URNS HPF: ABNORMAL /HPF
RSV RNA NPH QL NAA+NON-PROBE: NOT DETECTED
RV+EV RNA NPH QL NAA+NON-PROBE: NOT DETECTED
SARS-COV-2 RNA NPH QL NAA+NON-PROBE: NOT DETECTED
SODIUM SERPL-SCNC: 133 MMOL/L (ref 136–145)
SP GR UR STRIP: 1.01 (ref 1–1.03)
SPECIMEN DESCRIPTION: NORMAL
TROPONIN I SERPL HS-MCNC: 31 NG/L (ref 0–22)
UROBILINOGEN UR STRIP-ACNC: 0.2 EU/DL (ref 0–1)
WBC #/AREA URNS HPF: ABNORMAL /HPF
WBC OTHER # BLD: 12.1 K/UL (ref 4.5–11.5)

## 2025-08-09 PROCEDURE — 87040 BLOOD CULTURE FOR BACTERIA: CPT

## 2025-08-09 PROCEDURE — 2500000003 HC RX 250 WO HCPCS

## 2025-08-09 PROCEDURE — 87086 URINE CULTURE/COLONY COUNT: CPT

## 2025-08-09 PROCEDURE — 0202U NFCT DS 22 TRGT SARS-COV-2: CPT

## 2025-08-09 PROCEDURE — 86140 C-REACTIVE PROTEIN: CPT

## 2025-08-09 PROCEDURE — 99285 EMERGENCY DEPT VISIT HI MDM: CPT

## 2025-08-09 PROCEDURE — 81001 URINALYSIS AUTO W/SCOPE: CPT

## 2025-08-09 PROCEDURE — 2060000000 HC ICU INTERMEDIATE R&B

## 2025-08-09 PROCEDURE — 96375 TX/PRO/DX INJ NEW DRUG ADDON: CPT

## 2025-08-09 PROCEDURE — 80053 COMPREHEN METABOLIC PANEL: CPT

## 2025-08-09 PROCEDURE — 6360000002 HC RX W HCPCS

## 2025-08-09 PROCEDURE — 71045 X-RAY EXAM CHEST 1 VIEW: CPT

## 2025-08-09 PROCEDURE — 84484 ASSAY OF TROPONIN QUANT: CPT

## 2025-08-09 PROCEDURE — 2580000003 HC RX 258

## 2025-08-09 PROCEDURE — 84145 PROCALCITONIN (PCT): CPT

## 2025-08-09 PROCEDURE — 93005 ELECTROCARDIOGRAM TRACING: CPT

## 2025-08-09 PROCEDURE — 87088 URINE BACTERIA CULTURE: CPT

## 2025-08-09 PROCEDURE — 96365 THER/PROPH/DIAG IV INF INIT: CPT

## 2025-08-09 PROCEDURE — 83605 ASSAY OF LACTIC ACID: CPT

## 2025-08-09 PROCEDURE — 85025 COMPLETE CBC W/AUTO DIFF WBC: CPT

## 2025-08-09 RX ORDER — ALBUTEROL SULFATE 90 UG/1
1 INHALANT RESPIRATORY (INHALATION) EVERY 4 HOURS PRN
Status: DISCONTINUED | OUTPATIENT
Start: 2025-08-09 | End: 2025-08-09 | Stop reason: CLARIF

## 2025-08-09 RX ORDER — KETOROLAC TROMETHAMINE 30 MG/ML
15 INJECTION, SOLUTION INTRAMUSCULAR; INTRAVENOUS ONCE
Status: DISCONTINUED | OUTPATIENT
Start: 2025-08-09 | End: 2025-08-11 | Stop reason: HOSPADM

## 2025-08-09 RX ORDER — SODIUM CHLORIDE 0.9 % (FLUSH) 0.9 %
5-40 SYRINGE (ML) INJECTION EVERY 12 HOURS SCHEDULED
Status: DISCONTINUED | OUTPATIENT
Start: 2025-08-09 | End: 2025-08-11 | Stop reason: HOSPADM

## 2025-08-09 RX ORDER — SODIUM CHLORIDE 9 MG/ML
INJECTION, SOLUTION INTRAVENOUS PRN
Status: DISCONTINUED | OUTPATIENT
Start: 2025-08-09 | End: 2025-08-11 | Stop reason: HOSPADM

## 2025-08-09 RX ORDER — PANTOPRAZOLE SODIUM 40 MG/10ML
40 INJECTION, POWDER, LYOPHILIZED, FOR SOLUTION INTRAVENOUS DAILY
Status: DISCONTINUED | OUTPATIENT
Start: 2025-08-10 | End: 2025-08-11 | Stop reason: HOSPADM

## 2025-08-09 RX ORDER — 0.9 % SODIUM CHLORIDE 0.9 %
1000 INTRAVENOUS SOLUTION INTRAVENOUS ONCE
Status: COMPLETED | OUTPATIENT
Start: 2025-08-09 | End: 2025-08-09

## 2025-08-09 RX ORDER — ACETAMINOPHEN 500 MG
500 TABLET ORAL EVERY 6 HOURS PRN
Status: DISCONTINUED | OUTPATIENT
Start: 2025-08-09 | End: 2025-08-11 | Stop reason: HOSPADM

## 2025-08-09 RX ORDER — ONDANSETRON 4 MG/1
4 TABLET, ORALLY DISINTEGRATING ORAL EVERY 8 HOURS PRN
Status: DISCONTINUED | OUTPATIENT
Start: 2025-08-09 | End: 2025-08-11 | Stop reason: HOSPADM

## 2025-08-09 RX ORDER — ACETAMINOPHEN 325 MG/1
650 TABLET ORAL EVERY 4 HOURS PRN
Status: DISCONTINUED | OUTPATIENT
Start: 2025-08-09 | End: 2025-08-09 | Stop reason: SDUPTHER

## 2025-08-09 RX ORDER — ONDANSETRON 2 MG/ML
4 INJECTION INTRAMUSCULAR; INTRAVENOUS EVERY 6 HOURS PRN
Status: DISCONTINUED | OUTPATIENT
Start: 2025-08-09 | End: 2025-08-11 | Stop reason: HOSPADM

## 2025-08-09 RX ORDER — BUDESONIDE 0.5 MG/2ML
500 INHALANT ORAL
Status: DISCONTINUED | OUTPATIENT
Start: 2025-08-09 | End: 2025-08-11 | Stop reason: HOSPADM

## 2025-08-09 RX ORDER — ALBUTEROL SULFATE 0.83 MG/ML
2.5 SOLUTION RESPIRATORY (INHALATION) EVERY 4 HOURS PRN
Status: DISCONTINUED | OUTPATIENT
Start: 2025-08-09 | End: 2025-08-11 | Stop reason: HOSPADM

## 2025-08-09 RX ORDER — SODIUM CHLORIDE 0.9 % (FLUSH) 0.9 %
5-40 SYRINGE (ML) INJECTION PRN
Status: DISCONTINUED | OUTPATIENT
Start: 2025-08-09 | End: 2025-08-11 | Stop reason: HOSPADM

## 2025-08-09 RX ADMIN — DOXYCYCLINE 100 MG: 100 INJECTION, POWDER, LYOPHILIZED, FOR SOLUTION INTRAVENOUS at 16:27

## 2025-08-09 RX ADMIN — WATER 2000 MG: 1 INJECTION INTRAMUSCULAR; INTRAVENOUS; SUBCUTANEOUS at 16:10

## 2025-08-09 RX ADMIN — SODIUM CHLORIDE 1000 ML: 0.9 INJECTION, SOLUTION INTRAVENOUS at 14:40

## 2025-08-09 ASSESSMENT — PAIN SCALES - GENERAL
PAINLEVEL_OUTOF10: 0
PAINLEVEL_OUTOF10: 0

## 2025-08-09 ASSESSMENT — LIFESTYLE VARIABLES
HOW MANY STANDARD DRINKS CONTAINING ALCOHOL DO YOU HAVE ON A TYPICAL DAY: PATIENT DOES NOT DRINK
HOW OFTEN DO YOU HAVE A DRINK CONTAINING ALCOHOL: NEVER

## 2025-08-09 ASSESSMENT — PAIN - FUNCTIONAL ASSESSMENT: PAIN_FUNCTIONAL_ASSESSMENT: 0-10

## 2025-08-10 LAB
ALBUMIN SERPL-MCNC: 2.7 G/DL (ref 3.5–5.2)
ALP SERPL-CCNC: 136 U/L (ref 40–129)
ALT SERPL-CCNC: 31 U/L (ref 0–50)
ANION GAP SERPL CALCULATED.3IONS-SCNC: 11 MMOL/L (ref 7–16)
AST SERPL-CCNC: 47 U/L (ref 0–50)
BASOPHILS # BLD: 0.05 K/UL (ref 0–0.2)
BASOPHILS NFR BLD: 1 % (ref 0–2)
BILIRUB SERPL-MCNC: 0.5 MG/DL (ref 0–1.2)
BUN SERPL-MCNC: 15 MG/DL (ref 8–23)
CALCIUM SERPL-MCNC: 8.5 MG/DL (ref 8.8–10.2)
CHLORIDE SERPL-SCNC: 103 MMOL/L (ref 98–107)
CHOLEST SERPL-MCNC: 102 MG/DL
CO2 SERPL-SCNC: 21 MMOL/L (ref 22–29)
CREAT SERPL-MCNC: 0.7 MG/DL (ref 0.7–1.2)
CRP SERPL HS-MCNC: 40.7 MG/L (ref 0–5)
EOSINOPHIL # BLD: 0.02 K/UL (ref 0.05–0.5)
EOSINOPHILS RELATIVE PERCENT: 0 % (ref 0–6)
ERYTHROCYTE [DISTWIDTH] IN BLOOD BY AUTOMATED COUNT: 13.8 % (ref 11.5–15)
GFR, ESTIMATED: 90 ML/MIN/1.73M2
GLUCOSE SERPL-MCNC: 85 MG/DL (ref 74–99)
HCT VFR BLD AUTO: 41.8 % (ref 37–54)
HDLC SERPL-MCNC: 37 MG/DL
HGB BLD-MCNC: 12.7 G/DL (ref 12.5–16.5)
IMM GRANULOCYTES # BLD AUTO: 0.04 K/UL (ref 0–0.58)
IMM GRANULOCYTES NFR BLD: 0 % (ref 0–5)
LDLC SERPL CALC-MCNC: 58 MG/DL
LYMPHOCYTES NFR BLD: 1.76 K/UL (ref 1.5–4)
LYMPHOCYTES RELATIVE PERCENT: 17 % (ref 20–42)
MAGNESIUM SERPL-MCNC: 2 MG/DL (ref 1.6–2.4)
MCH RBC QN AUTO: 32.3 PG (ref 26–35)
MCHC RBC AUTO-ENTMCNC: 30.4 G/DL (ref 32–34.5)
MCV RBC AUTO: 106.4 FL (ref 80–99.9)
MICROORGANISM/AGENT SPEC: ABNORMAL
MONOCYTES NFR BLD: 1.05 K/UL (ref 0.1–0.95)
MONOCYTES NFR BLD: 10 % (ref 2–12)
NEUTROPHILS NFR BLD: 73 % (ref 43–80)
NEUTS SEG NFR BLD: 7.77 K/UL (ref 1.8–7.3)
PHOSPHATE SERPL-MCNC: 2.3 MG/DL (ref 2.5–4.5)
PLATELET # BLD AUTO: 159 K/UL (ref 130–450)
PMV BLD AUTO: 11.8 FL (ref 7–12)
POTASSIUM SERPL-SCNC: 3.9 MMOL/L (ref 3.5–5.1)
PROCALCITONIN SERPL-MCNC: 0.73 NG/ML (ref 0–0.08)
PROT SERPL-MCNC: 6.3 G/DL (ref 6.4–8.3)
RBC # BLD AUTO: 3.93 M/UL (ref 3.8–5.8)
SERVICE CMNT-IMP: ABNORMAL
SODIUM SERPL-SCNC: 135 MMOL/L (ref 136–145)
SPECIMEN DESCRIPTION: ABNORMAL
TRIGL SERPL-MCNC: 38 MG/DL
VLDLC SERPL CALC-MCNC: 8 MG/DL
WBC OTHER # BLD: 10.7 K/UL (ref 4.5–11.5)

## 2025-08-10 PROCEDURE — 87205 SMEAR GRAM STAIN: CPT

## 2025-08-10 PROCEDURE — 94640 AIRWAY INHALATION TREATMENT: CPT

## 2025-08-10 PROCEDURE — 85025 COMPLETE CBC W/AUTO DIFF WBC: CPT

## 2025-08-10 PROCEDURE — 36415 COLL VENOUS BLD VENIPUNCTURE: CPT

## 2025-08-10 PROCEDURE — 6360000002 HC RX W HCPCS: Performed by: INTERNAL MEDICINE

## 2025-08-10 PROCEDURE — 6370000000 HC RX 637 (ALT 250 FOR IP): Performed by: INTERNAL MEDICINE

## 2025-08-10 PROCEDURE — 84100 ASSAY OF PHOSPHORUS: CPT

## 2025-08-10 PROCEDURE — 2060000000 HC ICU INTERMEDIATE R&B

## 2025-08-10 PROCEDURE — 2500000003 HC RX 250 WO HCPCS: Performed by: INTERNAL MEDICINE

## 2025-08-10 PROCEDURE — 87070 CULTURE OTHR SPECIMN AEROBIC: CPT

## 2025-08-10 PROCEDURE — 87899 AGENT NOS ASSAY W/OPTIC: CPT

## 2025-08-10 PROCEDURE — 87449 NOS EACH ORGANISM AG IA: CPT

## 2025-08-10 PROCEDURE — 80061 LIPID PANEL: CPT

## 2025-08-10 PROCEDURE — 80053 COMPREHEN METABOLIC PANEL: CPT

## 2025-08-10 PROCEDURE — 2580000003 HC RX 258: Performed by: INTERNAL MEDICINE

## 2025-08-10 PROCEDURE — 83735 ASSAY OF MAGNESIUM: CPT

## 2025-08-10 RX ORDER — IPRATROPIUM BROMIDE AND ALBUTEROL SULFATE 2.5; .5 MG/3ML; MG/3ML
1 SOLUTION RESPIRATORY (INHALATION)
Status: DISCONTINUED | OUTPATIENT
Start: 2025-08-10 | End: 2025-08-11 | Stop reason: HOSPADM

## 2025-08-10 RX ADMIN — SODIUM CHLORIDE, PRESERVATIVE FREE 10 ML: 5 INJECTION INTRAVENOUS at 00:12

## 2025-08-10 RX ADMIN — IPRATROPIUM BROMIDE AND ALBUTEROL SULFATE 1 DOSE: 2.5; .5 SOLUTION RESPIRATORY (INHALATION) at 14:05

## 2025-08-10 RX ADMIN — IPRATROPIUM BROMIDE AND ALBUTEROL SULFATE 1 DOSE: 2.5; .5 SOLUTION RESPIRATORY (INHALATION) at 17:30

## 2025-08-10 RX ADMIN — DOXYCYCLINE 100 MG: 100 INJECTION, POWDER, LYOPHILIZED, FOR SOLUTION INTRAVENOUS at 16:53

## 2025-08-10 RX ADMIN — BUDESONIDE 500 MCG: 0.5 SUSPENSION RESPIRATORY (INHALATION) at 05:48

## 2025-08-10 RX ADMIN — BUDESONIDE 500 MCG: 0.5 SUSPENSION RESPIRATORY (INHALATION) at 17:24

## 2025-08-10 RX ADMIN — SODIUM CHLORIDE, PRESERVATIVE FREE 10 ML: 5 INJECTION INTRAVENOUS at 08:39

## 2025-08-10 RX ADMIN — PANTOPRAZOLE SODIUM 40 MG: 40 INJECTION, POWDER, FOR SOLUTION INTRAVENOUS at 08:39

## 2025-08-10 RX ADMIN — WATER 1000 MG: 1 INJECTION INTRAMUSCULAR; INTRAVENOUS; SUBCUTANEOUS at 16:44

## 2025-08-10 RX ADMIN — SODIUM CHLORIDE, PRESERVATIVE FREE 10 ML: 5 INJECTION INTRAVENOUS at 21:24

## 2025-08-10 RX ADMIN — DOXYCYCLINE 100 MG: 100 INJECTION, POWDER, LYOPHILIZED, FOR SOLUTION INTRAVENOUS at 04:42

## 2025-08-10 ASSESSMENT — PAIN SCALES - GENERAL
PAINLEVEL_OUTOF10: 0
PAINLEVEL_OUTOF10: 0

## 2025-08-11 VITALS
DIASTOLIC BLOOD PRESSURE: 69 MMHG | TEMPERATURE: 97.5 F | WEIGHT: 132 LBS | RESPIRATION RATE: 18 BRPM | BODY MASS INDEX: 21.21 KG/M2 | HEART RATE: 99 BPM | HEIGHT: 66 IN | OXYGEN SATURATION: 95 % | SYSTOLIC BLOOD PRESSURE: 138 MMHG

## 2025-08-11 PROBLEM — E44.0 MODERATE PROTEIN-CALORIE MALNUTRITION: Chronic | Status: ACTIVE | Noted: 2025-08-11

## 2025-08-11 LAB
ALBUMIN SERPL-MCNC: 2.8 G/DL (ref 3.5–5.2)
ALP SERPL-CCNC: 141 U/L (ref 40–129)
ALT SERPL-CCNC: 33 U/L (ref 0–50)
ANION GAP SERPL CALCULATED.3IONS-SCNC: 9 MMOL/L (ref 7–16)
AST SERPL-CCNC: 48 U/L (ref 0–50)
BASOPHILS # BLD: 0.02 K/UL (ref 0–0.2)
BASOPHILS NFR BLD: 0 % (ref 0–2)
BILIRUB SERPL-MCNC: 0.4 MG/DL (ref 0–1.2)
BUN SERPL-MCNC: 16 MG/DL (ref 8–23)
CALCIUM SERPL-MCNC: 8.5 MG/DL (ref 8.8–10.2)
CHLORIDE SERPL-SCNC: 103 MMOL/L (ref 98–107)
CO2 SERPL-SCNC: 24 MMOL/L (ref 22–29)
CREAT SERPL-MCNC: 0.6 MG/DL (ref 0.7–1.2)
EOSINOPHIL # BLD: 0.1 K/UL (ref 0.05–0.5)
EOSINOPHILS RELATIVE PERCENT: 1 % (ref 0–6)
ERYTHROCYTE [DISTWIDTH] IN BLOOD BY AUTOMATED COUNT: 13.6 % (ref 11.5–15)
GFR, ESTIMATED: >90 ML/MIN/1.73M2
GLUCOSE SERPL-MCNC: 101 MG/DL (ref 74–99)
HCT VFR BLD AUTO: 37.4 % (ref 37–54)
HGB BLD-MCNC: 12.2 G/DL (ref 12.5–16.5)
IMM GRANULOCYTES # BLD AUTO: <0.03 K/UL (ref 0–0.58)
IMM GRANULOCYTES NFR BLD: 0 % (ref 0–5)
L PNEUMO1 AG UR QL IA.RAPID: NEGATIVE
LYMPHOCYTES NFR BLD: 3.34 K/UL (ref 1.5–4)
LYMPHOCYTES RELATIVE PERCENT: 37 % (ref 20–42)
MCH RBC QN AUTO: 32.2 PG (ref 26–35)
MCHC RBC AUTO-ENTMCNC: 32.6 G/DL (ref 32–34.5)
MCV RBC AUTO: 98.7 FL (ref 80–99.9)
MICROORGANISM SPEC CULT: ABNORMAL
MICROORGANISM SPEC CULT: ABNORMAL
MONOCYTES NFR BLD: 0.93 K/UL (ref 0.1–0.95)
MONOCYTES NFR BLD: 10 % (ref 2–12)
NEUTROPHILS NFR BLD: 52 % (ref 43–80)
NEUTS SEG NFR BLD: 4.75 K/UL (ref 1.8–7.3)
PLATELET # BLD AUTO: 150 K/UL (ref 130–450)
PMV BLD AUTO: 12 FL (ref 7–12)
POTASSIUM SERPL-SCNC: 3.7 MMOL/L (ref 3.5–5.1)
PROT SERPL-MCNC: 6.4 G/DL (ref 6.4–8.3)
RBC # BLD AUTO: 3.79 M/UL (ref 3.8–5.8)
S PNEUM AG SPEC QL: NEGATIVE
SERVICE CMNT-IMP: ABNORMAL
SODIUM SERPL-SCNC: 136 MMOL/L (ref 136–145)
SPECIMEN DESCRIPTION: ABNORMAL
SPECIMEN SOURCE: NORMAL
WBC OTHER # BLD: 9.2 K/UL (ref 4.5–11.5)

## 2025-08-11 PROCEDURE — 36415 COLL VENOUS BLD VENIPUNCTURE: CPT

## 2025-08-11 PROCEDURE — 94640 AIRWAY INHALATION TREATMENT: CPT

## 2025-08-11 PROCEDURE — 97535 SELF CARE MNGMENT TRAINING: CPT

## 2025-08-11 PROCEDURE — 6360000002 HC RX W HCPCS: Performed by: INTERNAL MEDICINE

## 2025-08-11 PROCEDURE — 2580000003 HC RX 258: Performed by: INTERNAL MEDICINE

## 2025-08-11 PROCEDURE — 2500000003 HC RX 250 WO HCPCS: Performed by: INTERNAL MEDICINE

## 2025-08-11 PROCEDURE — 6370000000 HC RX 637 (ALT 250 FOR IP): Performed by: INTERNAL MEDICINE

## 2025-08-11 PROCEDURE — 85025 COMPLETE CBC W/AUTO DIFF WBC: CPT

## 2025-08-11 PROCEDURE — 80053 COMPREHEN METABOLIC PANEL: CPT

## 2025-08-11 PROCEDURE — 97165 OT EVAL LOW COMPLEX 30 MIN: CPT

## 2025-08-11 RX ORDER — CEFDINIR 300 MG/1
300 CAPSULE ORAL 2 TIMES DAILY
Qty: 10 CAPSULE | Refills: 0 | Status: SHIPPED | OUTPATIENT
Start: 2025-08-11 | End: 2025-08-16

## 2025-08-11 RX ORDER — DOXYCYCLINE 100 MG/1
100 CAPSULE ORAL 2 TIMES DAILY
Qty: 14 CAPSULE | Refills: 0 | Status: SHIPPED | OUTPATIENT
Start: 2025-08-11 | End: 2025-08-18

## 2025-08-11 RX ADMIN — DOXYCYCLINE 100 MG: 100 INJECTION, POWDER, LYOPHILIZED, FOR SOLUTION INTRAVENOUS at 04:38

## 2025-08-11 RX ADMIN — IPRATROPIUM BROMIDE AND ALBUTEROL SULFATE 1 DOSE: 2.5; .5 SOLUTION RESPIRATORY (INHALATION) at 14:16

## 2025-08-11 RX ADMIN — WATER 1000 MG: 1 INJECTION INTRAMUSCULAR; INTRAVENOUS; SUBCUTANEOUS at 16:41

## 2025-08-11 RX ADMIN — IPRATROPIUM BROMIDE AND ALBUTEROL SULFATE 1 DOSE: 2.5; .5 SOLUTION RESPIRATORY (INHALATION) at 06:46

## 2025-08-11 RX ADMIN — PANTOPRAZOLE SODIUM 40 MG: 40 INJECTION, POWDER, FOR SOLUTION INTRAVENOUS at 08:48

## 2025-08-11 RX ADMIN — SODIUM CHLORIDE, PRESERVATIVE FREE 10 ML: 5 INJECTION INTRAVENOUS at 08:48

## 2025-08-11 RX ADMIN — BUDESONIDE 500 MCG: 0.5 SUSPENSION RESPIRATORY (INHALATION) at 06:46

## 2025-08-11 ASSESSMENT — PAIN SCALES - GENERAL: PAINLEVEL_OUTOF10: 0

## 2025-08-13 LAB
EKG ATRIAL RATE: 111 BPM
EKG P AXIS: 81 DEGREES
EKG P-R INTERVAL: 194 MS
EKG Q-T INTERVAL: 320 MS
EKG QRS DURATION: 78 MS
EKG QTC CALCULATION (BAZETT): 435 MS
EKG R AXIS: -50 DEGREES
EKG T AXIS: 21 DEGREES
EKG VENTRICULAR RATE: 111 BPM

## 2025-08-14 LAB
MICROORGANISM SPEC CULT: NORMAL
MICROORGANISM SPEC CULT: NORMAL
SERVICE CMNT-IMP: NORMAL
SERVICE CMNT-IMP: NORMAL
SPECIMEN DESCRIPTION: NORMAL
SPECIMEN DESCRIPTION: NORMAL

## 2026-02-04 ENCOUNTER — APPOINTMENT (OUTPATIENT)
Dept: OTOLARYNGOLOGY | Facility: CLINIC | Age: 89
End: 2026-02-04
Payer: COMMERCIAL